# Patient Record
Sex: FEMALE | Race: WHITE | NOT HISPANIC OR LATINO | Employment: FULL TIME | URBAN - METROPOLITAN AREA
[De-identification: names, ages, dates, MRNs, and addresses within clinical notes are randomized per-mention and may not be internally consistent; named-entity substitution may affect disease eponyms.]

---

## 2019-09-26 ENCOUNTER — OFFICE VISIT (OUTPATIENT)
Dept: URGENT CARE | Facility: URGENT CARE | Age: 29
End: 2019-09-26
Payer: COMMERCIAL

## 2019-09-26 VITALS
HEART RATE: 89 BPM | WEIGHT: 159 LBS | OXYGEN SATURATION: 97 % | DIASTOLIC BLOOD PRESSURE: 60 MMHG | SYSTOLIC BLOOD PRESSURE: 120 MMHG | TEMPERATURE: 98.2 F

## 2019-09-26 DIAGNOSIS — B37.31 YEAST VAGINITIS: Primary | ICD-10-CM

## 2019-09-26 DIAGNOSIS — N89.8 VAGINAL DISCHARGE: ICD-10-CM

## 2019-09-26 LAB
SPECIMEN SOURCE: ABNORMAL
WET PREP SPEC: ABNORMAL

## 2019-09-26 PROCEDURE — 99203 OFFICE O/P NEW LOW 30 MIN: CPT | Performed by: FAMILY MEDICINE

## 2019-09-26 PROCEDURE — 87210 SMEAR WET MOUNT SALINE/INK: CPT | Performed by: FAMILY MEDICINE

## 2019-09-26 RX ORDER — FLUCONAZOLE 150 MG/1
150 TABLET ORAL ONCE
Qty: 1 TABLET | Refills: 0 | Status: SHIPPED | OUTPATIENT
Start: 2019-09-26 | End: 2019-09-26

## 2019-09-27 NOTE — PROGRESS NOTES
SUBJECTIVE:   Claudette Gilbert is a 29 year old female who  presents today for a possible vaginal discharge.    Patient developed discharge for the past 3-4 days.  No concerns for STD, no recent antibiotic use.  Had BV once.  No dysuria or frequency.  Patient did try using OTC vaginal applicator for yeast but still having symptoms.    No past medical history on file.  Current Outpatient Medications   Medication Sig Dispense Refill     Levonorgestrel-Ethinyl Estrad (LUTERA PO)        nitrofurantoin, macrocrystal-monohydrate, (MACROBID) 100 MG capsule Take 1 capsule (100 mg) by mouth 2 times daily (Patient not taking: Reported on 9/26/2019) 10 capsule 0     phenazopyridine (PYRIDIUM) 100 MG tablet Take 1 tablet (100 mg) by mouth 3 times daily as needed for pain (Patient not taking: Reported on 9/26/2019) 20 tablet 0     Social History     Tobacco Use     Smoking status: Never Smoker     Smokeless tobacco: Never Used   Substance Use Topics     Alcohol use: Not on file       ROS:   Review of systems negative except as stated above.    OBJECTIVE:  /60 (BP Location: Right arm)   Pulse 89   Temp 98.2  F (36.8  C) (Tympanic)   Wt 72.1 kg (159 lb)   LMP 09/16/2019   SpO2 97%   GENERAL APPEARANCE: healthy, alert and no distress  PSYCH: mentation appears normal and affect normal/bright    Results for orders placed or performed in visit on 09/26/19   Wet prep   Result Value Ref Range    Specimen Description Vagina     Wet Prep No Trichomonas seen     Wet Prep No clue cells seen     Wet Prep Few  Yeast seen   (A)     Wet Prep Rare  WBC'S seen       Wet Prep MANY OIL DROPLETS PRESENT        ASSESSMENT/PLAN:   (B37.3) Yeast vaginitis  (primary encounter diagnosis)  Plan: fluconazole (DIFLUCAN) 150 MG tablet            (N89.8) Vaginal discharge  Comment: yeast  Plan: Wet prep            Reassurance given, RX diflucan given for yeast vaginitis.  Okay to use OTC vaginal treatment if desires for symptomatic  treatment.    Follow up with primary provider if no improvement of symptoms in 1 week    Sylvain Gomez MD, MD  September 26, 2019 8:23 PM

## 2020-01-07 LAB
HBV SURFACE AG SERPL QL IA: NEGATIVE
HIV 1+2 AB+HIV1 P24 AG SERPL QL IA: NEGATIVE
RUBELLA ABY IGG: NORMAL
TREPONEMA ANTIBODIES: NORMAL

## 2020-01-16 ENCOUNTER — TRANSFERRED RECORDS (OUTPATIENT)
Dept: HEALTH INFORMATION MANAGEMENT | Facility: CLINIC | Age: 30
End: 2020-01-16
Payer: COMMERCIAL

## 2020-01-16 LAB — PAP-ABSTRACT: NORMAL

## 2020-07-14 LAB — GROUP B STREP PCR: NEGATIVE

## 2020-08-04 DIAGNOSIS — Z01.818 PREOP TESTING: Primary | ICD-10-CM

## 2020-08-05 DIAGNOSIS — Z01.818 PREOP TESTING: ICD-10-CM

## 2020-08-05 LAB
LABORATORY COMMENT REPORT: NORMAL
SARS-COV-2 RNA SPEC QL NAA+PROBE: NEGATIVE
SARS-COV-2 RNA SPEC QL NAA+PROBE: NORMAL
SPECIMEN SOURCE: NORMAL
SPECIMEN SOURCE: NORMAL

## 2020-08-05 PROCEDURE — U0003 INFECTIOUS AGENT DETECTION BY NUCLEIC ACID (DNA OR RNA); SEVERE ACUTE RESPIRATORY SYNDROME CORONAVIRUS 2 (SARS-COV-2) (CORONAVIRUS DISEASE [COVID-19]), AMPLIFIED PROBE TECHNIQUE, MAKING USE OF HIGH THROUGHPUT TECHNOLOGIES AS DESCRIBED BY CMS-2020-01-R: HCPCS | Performed by: OBSTETRICS & GYNECOLOGY

## 2020-08-07 ENCOUNTER — HOSPITAL ENCOUNTER (INPATIENT)
Facility: CLINIC | Age: 30
LOS: 2 days | Discharge: HOME | End: 2020-08-09
Attending: OBSTETRICS & GYNECOLOGY | Admitting: OBSTETRICS & GYNECOLOGY
Payer: COMMERCIAL

## 2020-08-07 ENCOUNTER — ANESTHESIA (OUTPATIENT)
Dept: OBGYN | Facility: CLINIC | Age: 30
End: 2020-08-07
Payer: COMMERCIAL

## 2020-08-07 ENCOUNTER — ANESTHESIA EVENT (OUTPATIENT)
Dept: OBGYN | Facility: CLINIC | Age: 30
End: 2020-08-07
Payer: COMMERCIAL

## 2020-08-07 LAB
ABO + RH BLD: NORMAL
ABO + RH BLD: NORMAL
RUPTURE OF FETAL MEMBRANES BY ROM PLUS: POSITIVE
SPECIMEN EXP DATE BLD: NORMAL

## 2020-08-07 PROCEDURE — 25000128 H RX IP 250 OP 636: Performed by: ANESTHESIOLOGY

## 2020-08-07 PROCEDURE — 25800030 ZZH RX IP 258 OP 636: Performed by: OBSTETRICS & GYNECOLOGY

## 2020-08-07 PROCEDURE — 25000125 ZZHC RX 250: Performed by: ANESTHESIOLOGY

## 2020-08-07 PROCEDURE — 86780 TREPONEMA PALLIDUM: CPT | Performed by: OBSTETRICS & GYNECOLOGY

## 2020-08-07 PROCEDURE — 40000671 ZZH STATISTIC ANESTHESIA CASE

## 2020-08-07 PROCEDURE — 25000128 H RX IP 250 OP 636: Performed by: OBSTETRICS & GYNECOLOGY

## 2020-08-07 PROCEDURE — 25000125 ZZHC RX 250: Performed by: OBSTETRICS & GYNECOLOGY

## 2020-08-07 PROCEDURE — 0KQM0ZZ REPAIR PERINEUM MUSCLE, OPEN APPROACH: ICD-10-PCS | Performed by: OBSTETRICS & GYNECOLOGY

## 2020-08-07 PROCEDURE — 84112 EVAL AMNIOTIC FLUID PROTEIN: CPT | Performed by: OBSTETRICS & GYNECOLOGY

## 2020-08-07 PROCEDURE — 37000011 ZZH ANESTHESIA WARD SERVICE

## 2020-08-07 PROCEDURE — 3E0R3BZ INTRODUCTION OF ANESTHETIC AGENT INTO SPINAL CANAL, PERCUTANEOUS APPROACH: ICD-10-PCS | Performed by: ANESTHESIOLOGY

## 2020-08-07 PROCEDURE — 86900 BLOOD TYPING SEROLOGIC ABO: CPT | Performed by: OBSTETRICS & GYNECOLOGY

## 2020-08-07 PROCEDURE — 00HU33Z INSERTION OF INFUSION DEVICE INTO SPINAL CANAL, PERCUTANEOUS APPROACH: ICD-10-PCS | Performed by: ANESTHESIOLOGY

## 2020-08-07 PROCEDURE — 86901 BLOOD TYPING SEROLOGIC RH(D): CPT | Performed by: OBSTETRICS & GYNECOLOGY

## 2020-08-07 PROCEDURE — G0463 HOSPITAL OUTPT CLINIC VISIT: HCPCS

## 2020-08-07 PROCEDURE — 12000000 ZZH R&B MED SURG/OB

## 2020-08-07 PROCEDURE — 72200001 ZZH LABOR CARE VAGINAL DELIVERY SINGLE

## 2020-08-07 RX ORDER — NALBUPHINE HYDROCHLORIDE 20 MG/ML
2.5-5 INJECTION, SOLUTION INTRAMUSCULAR; INTRAVENOUS; SUBCUTANEOUS EVERY 6 HOURS PRN
Status: DISCONTINUED | OUTPATIENT
Start: 2020-08-07 | End: 2020-08-08

## 2020-08-07 RX ORDER — EPHEDRINE SULFATE 50 MG/ML
5 INJECTION, SOLUTION INTRAMUSCULAR; INTRAVENOUS; SUBCUTANEOUS
Status: DISCONTINUED | OUTPATIENT
Start: 2020-08-07 | End: 2020-08-08

## 2020-08-07 RX ORDER — LIDOCAINE 40 MG/G
CREAM TOPICAL
Status: DISCONTINUED | OUTPATIENT
Start: 2020-08-07 | End: 2020-08-08

## 2020-08-07 RX ORDER — ONDANSETRON 2 MG/ML
4 INJECTION INTRAMUSCULAR; INTRAVENOUS EVERY 6 HOURS PRN
Status: DISCONTINUED | OUTPATIENT
Start: 2020-08-07 | End: 2020-08-08

## 2020-08-07 RX ORDER — LIDOCAINE HYDROCHLORIDE AND EPINEPHRINE 15; 5 MG/ML; UG/ML
INJECTION, SOLUTION EPIDURAL PRN
Status: DISCONTINUED | OUTPATIENT
Start: 2020-08-07 | End: 2020-08-07

## 2020-08-07 RX ORDER — BUPIVACAINE HYDROCHLORIDE 2.5 MG/ML
INJECTION, SOLUTION EPIDURAL; INFILTRATION; INTRACAUDAL PRN
Status: DISCONTINUED | OUTPATIENT
Start: 2020-08-07 | End: 2020-08-07

## 2020-08-07 RX ORDER — PRENATAL VIT/IRON FUM/FOLIC AC 27MG-0.8MG
1 TABLET ORAL DAILY
COMMUNITY
End: 2021-11-29

## 2020-08-07 RX ORDER — NALOXONE HYDROCHLORIDE 0.4 MG/ML
.1-.4 INJECTION, SOLUTION INTRAMUSCULAR; INTRAVENOUS; SUBCUTANEOUS
Status: DISCONTINUED | OUTPATIENT
Start: 2020-08-07 | End: 2020-08-08

## 2020-08-07 RX ORDER — OXYTOCIN 10 [USP'U]/ML
10 INJECTION, SOLUTION INTRAMUSCULAR; INTRAVENOUS
Status: DISCONTINUED | OUTPATIENT
Start: 2020-08-07 | End: 2020-08-08

## 2020-08-07 RX ORDER — OXYTOCIN/0.9 % SODIUM CHLORIDE 30/500 ML
100-340 PLASTIC BAG, INJECTION (ML) INTRAVENOUS CONTINUOUS PRN
Status: COMPLETED | OUTPATIENT
Start: 2020-08-07 | End: 2020-08-07

## 2020-08-07 RX ORDER — ACETAMINOPHEN 325 MG/1
650 TABLET ORAL EVERY 4 HOURS PRN
Status: DISCONTINUED | OUTPATIENT
Start: 2020-08-07 | End: 2020-08-08

## 2020-08-07 RX ORDER — METHYLERGONOVINE MALEATE 0.2 MG/ML
200 INJECTION INTRAVENOUS
Status: DISCONTINUED | OUTPATIENT
Start: 2020-08-07 | End: 2020-08-08

## 2020-08-07 RX ORDER — CARBOPROST TROMETHAMINE 250 UG/ML
250 INJECTION, SOLUTION INTRAMUSCULAR
Status: DISCONTINUED | OUTPATIENT
Start: 2020-08-07 | End: 2020-08-08

## 2020-08-07 RX ORDER — SODIUM CHLORIDE, SODIUM LACTATE, POTASSIUM CHLORIDE, CALCIUM CHLORIDE 600; 310; 30; 20 MG/100ML; MG/100ML; MG/100ML; MG/100ML
INJECTION, SOLUTION INTRAVENOUS CONTINUOUS
Status: DISCONTINUED | OUTPATIENT
Start: 2020-08-07 | End: 2020-08-08

## 2020-08-07 RX ORDER — FENTANYL CITRATE 50 UG/ML
50-100 INJECTION, SOLUTION INTRAMUSCULAR; INTRAVENOUS
Status: DISCONTINUED | OUTPATIENT
Start: 2020-08-07 | End: 2020-08-08

## 2020-08-07 RX ORDER — OXYTOCIN/0.9 % SODIUM CHLORIDE 30/500 ML
1-24 PLASTIC BAG, INJECTION (ML) INTRAVENOUS CONTINUOUS
Status: DISCONTINUED | OUTPATIENT
Start: 2020-08-07 | End: 2020-08-08

## 2020-08-07 RX ORDER — NALOXONE HYDROCHLORIDE 0.4 MG/ML
.1-.4 INJECTION, SOLUTION INTRAMUSCULAR; INTRAVENOUS; SUBCUTANEOUS
Status: DISCONTINUED | OUTPATIENT
Start: 2020-08-07 | End: 2020-08-07

## 2020-08-07 RX ORDER — IBUPROFEN 800 MG/1
800 TABLET, FILM COATED ORAL
Status: DISCONTINUED | OUTPATIENT
Start: 2020-08-07 | End: 2020-08-08

## 2020-08-07 RX ORDER — OXYCODONE AND ACETAMINOPHEN 5; 325 MG/1; MG/1
1 TABLET ORAL
Status: DISCONTINUED | OUTPATIENT
Start: 2020-08-07 | End: 2020-08-08

## 2020-08-07 RX ORDER — ONDANSETRON 2 MG/ML
4 INJECTION INTRAMUSCULAR; INTRAVENOUS EVERY 6 HOURS PRN
Status: DISCONTINUED | OUTPATIENT
Start: 2020-08-07 | End: 2020-08-07

## 2020-08-07 RX ORDER — TRANEXAMIC ACID 10 MG/ML
1 INJECTION, SOLUTION INTRAVENOUS EVERY 30 MIN PRN
Status: DISCONTINUED | OUTPATIENT
Start: 2020-08-07 | End: 2020-08-08

## 2020-08-07 RX ADMIN — BUPIVACAINE HYDROCHLORIDE 10 ML: 2.5 INJECTION, SOLUTION EPIDURAL; INFILTRATION; INTRACAUDAL at 18:41

## 2020-08-07 RX ADMIN — Medication 2 MILLI-UNITS/MIN: at 15:03

## 2020-08-07 RX ADMIN — LIDOCAINE HYDROCHLORIDE,EPINEPHRINE BITARTRATE 3 ML: 15; .005 INJECTION, SOLUTION EPIDURAL; INFILTRATION; INTRACAUDAL; PERINEURAL at 18:36

## 2020-08-07 RX ADMIN — Medication 100 ML/HR: at 23:56

## 2020-08-07 RX ADMIN — Medication: at 18:43

## 2020-08-07 RX ADMIN — SODIUM CHLORIDE, POTASSIUM CHLORIDE, SODIUM LACTATE AND CALCIUM CHLORIDE: 600; 310; 30; 20 INJECTION, SOLUTION INTRAVENOUS at 13:04

## 2020-08-07 RX ADMIN — ONDANSETRON 4 MG: 2 INJECTION INTRAMUSCULAR; INTRAVENOUS at 18:05

## 2020-08-07 RX ADMIN — LIDOCAINE HYDROCHLORIDE,EPINEPHRINE BITARTRATE 2 ML: 15; .005 INJECTION, SOLUTION EPIDURAL; INFILTRATION; INTRACAUDAL; PERINEURAL at 18:38

## 2020-08-07 RX ADMIN — SODIUM CHLORIDE, POTASSIUM CHLORIDE, SODIUM LACTATE AND CALCIUM CHLORIDE 1000 ML: 600; 310; 30; 20 INJECTION, SOLUTION INTRAVENOUS at 18:05

## 2020-08-07 RX ADMIN — PROCHLORPERAZINE EDISYLATE 5 MG: 5 INJECTION INTRAMUSCULAR; INTRAVENOUS at 21:59

## 2020-08-07 ASSESSMENT — MIFFLIN-ST. JEOR: SCORE: 1612.65

## 2020-08-07 NOTE — PROVIDER NOTIFICATION
08/07/20 1333   Provider Notification   Provider Name/Title Dr. Huffman   Method of Notification At Bedside   Met patient and POC to start pitocin in a couple hours per Dr. Huffman.

## 2020-08-07 NOTE — PROVIDER NOTIFICATION
08/07/20 1243   Provider Notification   Provider Name/Title Dr. Huffman   Method of Notification Phone   Request Evaluate - Remote   Notification Reason Lab/Diagnostic Study   POC to admit patient and watch for 1 hour and if no contraction/labor may start pitocin. SAURABH

## 2020-08-07 NOTE — H&P
"[facility] Labor and Delivery Progress Note    Claudette Gilbert MRN# [id number]   Age: 30 year old YOB: 1990           Subjective:     30 yr old  at 39+4 admitted with SROM - clear and regular cxts. Prenatal care uncomplicated ( transferred to [facility] at 33 wks)          Objective:     Patient Vitals for the past 8 hrs:   BP Temp Temp src Pulse Resp Height Weight   20 1251 -- 98.9  F (37.2  C) Oral -- -- -- --   20 1146 127/89 99  F (37.2  C) Oral 92 18 1.676 m (5' 6\") 87.1 kg (192 lb)        Cervical Exam:  2/60%/-2        Position: Anterior    Membranes: Ruptured   - clear    Fetal Heart Rate:    Monitor:   external   Variability:   average   Baseline Rate:   140's; + accels   Fetal Heart Rate Tracing: cat 1    GBS neg  COVID neg ( 2 days ago )  EFW 9# and vtx    Hx  without complication ( 9# )          Assessment:   1)  IUP at  39w4d   2)  SROM/early labor        Plan:   Pitocin as clinically indicated  Epidural      Fany Huffman MD        "

## 2020-08-07 NOTE — PLAN OF CARE
Data: [facility]: 2020 11:06 AM.  Reason for maternal/fetal assessment is leaking vaginal fluid. Patient reports seeing some clear mucus this morning at 0500 with occasional continued leaking .  Patient is a .  Prenatal record reviewed. Pregnancy has been uncomplicated..  Gestational Age 39w4d. VSS. Fetal movement present. Patient denies uterine contractions, vaginal bleeding, abdominal pain, pelvic pressure, nausea, vomiting, headache, visual disturbances, epigastric or URQ pain, significant edema. Support person is present.   Action: Verbal consent for EFM. Triage assessment completed. Bill of rights reviewed.  Response: Patient verbalized agreement with plan. Will contact Dr Fany Huffman with update and further orders.

## 2020-08-07 NOTE — PROVIDER NOTIFICATION
08/07/20 1735   Provider Notification   Provider Name/Title Dr. Pro   Method of Notification Phone   Request Evaluate - Remote   Notification Reason Status Update   Updated MD and he is 25-30 min out no change in POC

## 2020-08-07 NOTE — ANESTHESIA PREPROCEDURE EVALUATION
Anesthesia Pre-Procedure Evaluation    Patient: Claudette Gilbert   MRN: [id number] : 1990          Preoperative Diagnosis: * No surgery found *        History reviewed. No pertinent past medical history.  Past Surgical History:   Procedure Laterality Date     GALLBLADDER SURGERY  2017     Anesthesia Evaluation       history and physical reviewed . Pt has had prior anesthetic. Type: Regional    No history of anesthetic complications          ROS/MED HX    ENT/Pulmonary:  - neg pulmonary ROS    (-) asthma   Neurologic:  - neg neurologic ROS    (-) Other neuro hx and Neuropathy   Cardiovascular:  - neg cardiovascular ROS      (-) hypertension, taking anticoagulants/antiplatelets and syncope   METS/Exercise Tolerance:     Hematologic:  - neg hematologic  ROS       Musculoskeletal:   (+)  other musculoskeletal- significant lumbar left scoliosis      GI/Hepatic:  - neg GI/hepatic ROS       Renal/Genitourinary:  - ROS Renal section negative       Endo:  - neg endo ROS       Psychiatric:         Infectious Disease:  - neg infectious disease ROS       Malignancy:         Other:                     neg OB ROS            Physical Exam  Normal systems: cardiovascular, pulmonary and dental    Airway   Mallampati: II  TM distance: > 3 FB  Neck ROM: full  Mouth opening: > 3 cm    Dental     Cardiovascular       Pulmonary             No results found for: WBC, HGB, HCT, PLT, CRP, SED, NA, POTASSIUM, CHLORIDE, CO2, BUN, CR, GLC, WONG, PHOS, MAG, ALBUMIN, PROTTOTAL, ALT, AST, GGT, ALKPHOS, BILITOTAL, BILIDIRECT, LIPASE, AMYLASE, JOEY, PTT, INR, FIBR, TSH, T4, T3, HCG, HCGS, CKTOTAL, CKMB, TROPN    Preop Vitals  BP Readings from Last 3 Encounters:   20 121/79   19 120/60   13 130/64    Pulse Readings from Last 3 Encounters:   20 81   19 89   13 94      Resp Readings from Last 3 Encounters:   20 18    SpO2 Readings from Last 3 Encounters:   20 100%   19 97%   13 97%     "  Temp Readings from Last 1 Encounters:   08/07/20 98  F (36.7  C) (Oral)    Ht Readings from Last 1 Encounters:   08/07/20 1.676 m (5' 6\")      Wt Readings from Last 1 Encounters:   08/07/20 87.1 kg (192 lb)    Estimated body mass index is 30.99 kg/m  as calculated from the following:    Height as of this encounter: 1.676 m (5' 6\").    Weight as of this encounter: 87.1 kg (192 lb).       Anesthesia Plan  Procedure only, no anesthetic delivered    History & Physical Review  History and physical reviewed and following examination; no interval change.    ASA Status:  2 .  OB Epidural Asa: 2       Plan for Epidural         Continuous Labor Epidural: Indication is for labor pain. Following an discussion of the expectations, benefits and risks (including but not limited to nerve damage, infection, bleeding, spinal headache, partial or failed block), the patient appears to understand and consents to proceed. Questions were encouraged and answered.         Postoperative Care      Consents  Anesthetic plan, risks, benefits and alternatives discussed with:  Patient and Patient..                 Adrian Herrera MD                    .  "

## 2020-08-08 LAB — T PALLIDUM AB SER QL: NONREACTIVE

## 2020-08-08 PROCEDURE — 25000125 ZZHC RX 250: Performed by: OBSTETRICS & GYNECOLOGY

## 2020-08-08 PROCEDURE — 12000000 ZZH R&B MED SURG/OB

## 2020-08-08 PROCEDURE — 40000083 ZZH STATISTIC IP LACTATION SERVICES 1-15 MIN

## 2020-08-08 PROCEDURE — 25000132 ZZH RX MED GY IP 250 OP 250 PS 637: Performed by: OBSTETRICS & GYNECOLOGY

## 2020-08-08 RX ORDER — OXYTOCIN 10 [USP'U]/ML
10 INJECTION, SOLUTION INTRAMUSCULAR; INTRAVENOUS
Status: DISCONTINUED | OUTPATIENT
Start: 2020-08-08 | End: 2020-08-09 | Stop reason: HOSPADM

## 2020-08-08 RX ORDER — MODIFIED LANOLIN
OINTMENT (GRAM) TOPICAL
Status: DISCONTINUED | OUTPATIENT
Start: 2020-08-08 | End: 2020-08-09 | Stop reason: HOSPADM

## 2020-08-08 RX ORDER — TRANEXAMIC ACID 10 MG/ML
1 INJECTION, SOLUTION INTRAVENOUS EVERY 30 MIN PRN
Status: DISCONTINUED | OUTPATIENT
Start: 2020-08-08 | End: 2020-08-09 | Stop reason: HOSPADM

## 2020-08-08 RX ORDER — ACETAMINOPHEN 325 MG/1
650 TABLET ORAL EVERY 4 HOURS PRN
Status: DISCONTINUED | OUTPATIENT
Start: 2020-08-08 | End: 2020-08-09 | Stop reason: HOSPADM

## 2020-08-08 RX ORDER — IBUPROFEN 800 MG/1
800 TABLET, FILM COATED ORAL EVERY 6 HOURS PRN
Status: DISCONTINUED | OUTPATIENT
Start: 2020-08-08 | End: 2020-08-09 | Stop reason: HOSPADM

## 2020-08-08 RX ORDER — BISACODYL 10 MG
10 SUPPOSITORY, RECTAL RECTAL DAILY PRN
Status: DISCONTINUED | OUTPATIENT
Start: 2020-08-10 | End: 2020-08-09 | Stop reason: HOSPADM

## 2020-08-08 RX ORDER — AMOXICILLIN 250 MG
1 CAPSULE ORAL 2 TIMES DAILY
Status: DISCONTINUED | OUTPATIENT
Start: 2020-08-08 | End: 2020-08-09 | Stop reason: HOSPADM

## 2020-08-08 RX ORDER — OXYTOCIN/0.9 % SODIUM CHLORIDE 30/500 ML
340 PLASTIC BAG, INJECTION (ML) INTRAVENOUS CONTINUOUS PRN
Status: DISCONTINUED | OUTPATIENT
Start: 2020-08-08 | End: 2020-08-09 | Stop reason: HOSPADM

## 2020-08-08 RX ORDER — NALOXONE HYDROCHLORIDE 0.4 MG/ML
.1-.4 INJECTION, SOLUTION INTRAMUSCULAR; INTRAVENOUS; SUBCUTANEOUS
Status: DISCONTINUED | OUTPATIENT
Start: 2020-08-08 | End: 2020-08-09 | Stop reason: HOSPADM

## 2020-08-08 RX ORDER — OXYTOCIN/0.9 % SODIUM CHLORIDE 30/500 ML
100 PLASTIC BAG, INJECTION (ML) INTRAVENOUS CONTINUOUS
Status: DISCONTINUED | OUTPATIENT
Start: 2020-08-08 | End: 2020-08-09 | Stop reason: HOSPADM

## 2020-08-08 RX ORDER — HYDROCORTISONE 2.5 %
CREAM (GRAM) TOPICAL 3 TIMES DAILY PRN
Status: DISCONTINUED | OUTPATIENT
Start: 2020-08-08 | End: 2020-08-09 | Stop reason: HOSPADM

## 2020-08-08 RX ORDER — OXYCODONE HYDROCHLORIDE 5 MG/1
5 TABLET ORAL EVERY 4 HOURS PRN
Status: DISCONTINUED | OUTPATIENT
Start: 2020-08-08 | End: 2020-08-09 | Stop reason: HOSPADM

## 2020-08-08 RX ORDER — AMOXICILLIN 250 MG
2 CAPSULE ORAL 2 TIMES DAILY
Status: DISCONTINUED | OUTPATIENT
Start: 2020-08-08 | End: 2020-08-09 | Stop reason: HOSPADM

## 2020-08-08 RX ADMIN — SENNOSIDES AND DOCUSATE SODIUM 1 TABLET: 8.6; 5 TABLET ORAL at 20:11

## 2020-08-08 RX ADMIN — IBUPROFEN 800 MG: 800 TABLET, FILM COATED ORAL at 18:41

## 2020-08-08 RX ADMIN — IBUPROFEN 800 MG: 800 TABLET, FILM COATED ORAL at 07:11

## 2020-08-08 RX ADMIN — SENNOSIDES AND DOCUSATE SODIUM 1 TABLET: 8.6; 5 TABLET ORAL at 11:37

## 2020-08-08 RX ADMIN — IBUPROFEN 800 MG: 800 TABLET, FILM COATED ORAL at 12:59

## 2020-08-08 RX ADMIN — LIDOCAINE HYDROCHLORIDE 20 ML: 10 INJECTION, SOLUTION EPIDURAL; INFILTRATION; INTRACAUDAL; PERINEURAL at 00:06

## 2020-08-08 NOTE — LACTATION NOTE
Lactation visit. Patient has been pumping and feeding LGA  EBM since birth. Blood sugar levels stable, but needing 2 more above 45 to discontinue. She has been getting anywhere between 3-15mL per session, 15mL last time. She has no concerns or questions with pumping. Her plan is to exclusively pump. Encouraged her to call if questions or concerns arise.

## 2020-08-08 NOTE — PROGRESS NOTES
Post-partum Note      S: Patient is doing well today.  Pain is controlled with PO medications.  Tolerating regular diet without nausea or vomiting.  Ambulating without dizziness.  Urinating without difficulty. Lochia normal.  Breastfeeding.    O:   Patient Vitals for the past 24 hrs:   BP Temp Temp src Pulse Resp SpO2 Height Weight   08/08/20 0900 122/72 98.5  F (36.9  C) Oral 94 18 -- -- --   08/08/20 0145 127/86 -- -- -- -- -- -- --   08/08/20 0130 133/87 -- -- -- -- -- -- --   08/08/20 0115 133/84 -- -- -- -- -- -- --   08/08/20 0100 123/82 -- -- -- -- -- -- --   08/08/20 0045 122/80 -- -- -- -- -- -- --   08/08/20 0030 128/79 -- -- -- -- -- -- --   08/08/20 0015 (!) 140/71 -- -- -- -- -- -- --   08/08/20 0004 -- 98.1  F (36.7  C) Oral -- -- -- -- --   08/07/20 2340 127/69 -- -- -- -- -- -- --   08/07/20 2335 (!) 180/97 -- -- -- -- -- -- --   08/07/20 2330 126/82 -- -- -- -- -- -- --   08/07/20 2315 133/82 -- -- -- -- -- -- --   08/07/20 2310 (!) 145/96 -- -- -- -- -- -- --   08/07/20 2306 (!) 140/80 -- -- -- -- -- -- --   08/07/20 2300 130/80 97.6  F (36.4  C) Oral -- 18 -- -- --   08/07/20 2250 122/75 -- -- -- -- -- -- --   08/07/20 2215 119/68 -- -- -- -- -- -- --   08/07/20 2145 120/81 -- -- -- -- -- -- --   08/07/20 2100 -- 98  F (36.7  C) Oral -- -- -- -- --   08/07/20 2045 120/81 -- -- -- -- -- -- --   08/07/20 2015 126/82 -- -- -- -- -- -- --   08/07/20 1950 136/82 98.2  F (36.8  C) Oral -- -- -- -- --   08/07/20 1915 120/74 98.2  F (36.8  C) Oral -- 18 -- -- --   08/07/20 1910 134/85 -- -- -- -- 97 % -- --   08/07/20 1904 121/77 -- -- -- -- -- -- --   08/07/20 1903 121/77 -- -- -- -- -- -- --   08/07/20 1859 117/76 -- -- -- -- -- -- --   08/07/20 1856 117/76 -- -- -- -- -- -- --   08/07/20 1853 122/76 -- -- -- -- -- -- --   08/07/20 1851 120/76 -- -- -- -- -- -- --   08/07/20 1849 118/77 -- -- -- -- -- -- --   08/07/20 1847 118/79 -- -- -- -- -- -- --   08/07/20 1845 121/79 98  F (36.7  C) Oral -- -- 100  "% -- --   20 1843 125/79 -- -- -- -- -- -- --   20 1841 133/82 -- -- -- -- -- -- --   20 1839 (!) 142/91 -- -- -- -- -- -- --   20 1837 (!) 145/92 -- -- -- -- -- -- --   20 1835 (!) 143/87 -- -- -- -- -- -- --   20 1812 -- -- -- -- 18 -- -- --   20 1800 -- 98.2  F (36.8  C) Oral -- -- -- -- --   20 1751 (!) 146/86 -- -- 81 18 -- -- --   20 1655 -- 98.1  F (36.7  C) Oral -- -- -- -- --   20 1622 125/88 -- -- 87 18 -- -- --   20 1556 -- 98.7  F (37.1  C) Oral -- -- -- -- --   20 1506 127/82 98.1  F (36.7  C) Oral -- 18 -- -- --   20 1251 -- 98.9  F (37.2  C) Oral -- -- -- -- --   20 1146 127/89 99  F (37.2  C) Oral 92 18 -- 1.676 m (5' 6\") 87.1 kg (192 lb)     Gen:  Resting comfortably, NAD  Pulm:  Breathing comfortably on room air  Abd:  Soft, appropriately ttp, non-distended.Fundus at umbilicus, firm and non-tender.  Ext:  non-tender, trace bilateral LE edema    I/O last 3 completed shifts:  In: -   Out: 712 [Urine:500; Blood:212]    Hgb:     No results found for: HGB    Assessment/Plan:  30 year old G2 now  on PPD #1 s/p  after presenting in spontaneous labor at 39w5d. 2nd degree laceration with repair     1. Continue with routine postpartum management  2. Pain well controlled  3. EBL: 212ml ; HGB not collected   4.   Lab Results   Component Value Date    ABO A 2020    RH Pos 2020    , Rubella immune   5. Feed: Breastfeeding, going well.   6. CV/RESP: no acute issues  7. DVT PPX: ambulation  8. COVID negative     Dispo: Anticipate DC home PPD2.    MD Son [facility]  2020, 9:36 AM    "

## 2020-08-08 NOTE — PROVIDER NOTIFICATION
08/07/20 1950   Provider Notification   Provider Name/Title Dr. Pro   Method of Notification At Bedside     MD at bedside for rupture of forebag. Moderate amount of meconium stained fluid noted, and SVE per MD 4/70/-1.

## 2020-08-08 NOTE — PLAN OF CARE
Patient stable and meeting expected goals. VSS. Up with assistance, as right leg is still numb from epidural. Patient able to ambulate to bathroom with assistance but educated to call before getting up. Patient declined pain medication. Feeding plan is to pump and bottle. No EBM from pumping yet, but patient successful with hand expression. Educated on feeding closer to 2 hours, due to fluctuating infant OT's, and to begin the pumping/hand expression process sooner, so infant will be fed at 2 hours. Parents open to supplementation if needed. Bonding with infant. Continue to monitor.

## 2020-08-08 NOTE — PROVIDER NOTIFICATION
08/07/20 2300   Provider Notification   Provider Name/Title MDA   Method of Notification At Bedside     MDA at bedside for tommy.

## 2020-08-08 NOTE — L&D DELIVERY NOTE
OB Vaginal Delivery Note      HPI:  Pt is a 30 year old  @ 39w5d who presented to L&D on 2020 for SROM.            Prenatal labs:  A antibody screen: negative, Rubella inmune,  Hep B/HIV/RPR all negative, GC/CT negative, GCT normal, GBS neg    Pregnancy complications:  CHARAN at 33 wks    Hospital Course:    First Stage: On admission, contractions were every 6 minutes and patient was 2cm dilated. FHTs were in the 140 with accelerations present. mod variability,  no decelerations noted.   Abdomen was non-tender.  EFW was 9lbs by Leopold's.  Patient's labor induced with pitocin.    At the patient's request, she received epidural  analgesia.  She did receive pitocin for induction of labor, to a maximum of 8mu/min.  SROM occurred at 0500 with clerar fluid noted, at 1953 a forebag was noted and upon rupture there was mec stained fluid .  Patient reached complete cervical dilation at 2335 on 2020.    Second Stage:  Patient did not  labor down , and began pushing at 2335.  Good maternal expulsive efforts were noted.  Fetal heart tones remained reassuring during the second stage.  Baseline 130, with mod variability, no decelerations noted.  She was able to bring the fetal vertex to a full crown.  The fetal vertex was then easily delivered, followed by the fetal shoulders without complications.  A  nuchal cord was not present.  A male infant was then delivered without complications at 2353 on 2020.  The mouth and nares were bulb suctioned.  The cord was clamped after it had stopped pulsating, cut and the infant was placed on moms abdomen.  The infant's weight was 10 pounds 0.5 ounces.  Apgars were 8 and 9 at one and five minutes .       Third Stage:  The placenta then delivered at 1157 .  It was noted to be intact with a three vessel cord.  The patient's perineum was inspected and  2nd degree midline noted.  The area was infiltrated with 1% lidocaine and repaired in the usual fashion using 3-0 vicryl suture.   EBL for the procedure was 212cc.    Sponge and needle counts were correct.  The patient and infant remained in the delivery suite following delivery in stable condition.    Christofer Pro MD  8/8/2020  12:17 AM

## 2020-08-08 NOTE — PLAN OF CARE
Patient up and around voiding without complications taking ibuprofen for pain with relief -using tucks and ice to bottom

## 2020-08-08 NOTE — ANESTHESIA POSTPROCEDURE EVALUATION
Patient: Claudette Gilbert    * No procedures listed *    Diagnosis:* No pre-op diagnosis entered *  Diagnosis Additional Information: No value filed.    Anesthesia Type:  Epidural    Note:  Anesthesia Post Evaluation         Comments:     S/P epidural for labor.   I or my partner was immediately available for management of this patient during epidural analgesia infusion.  VSS.  Doing well. Block resolved.  Neuro at baseline. Denies positional headache. Minimal side effects easily managed w/ PRN meds. No apparent anesthetic complications. No follow-up required.    Dong Miramontes MD          Last vitals:  Vitals:    08/08/20 0145 08/08/20 0900 08/08/20 1300   BP: 127/86 122/72 125/63   Pulse:  94 97   Resp:  18 20   Temp:  98.5  F (36.9  C) 98.4  F (36.9  C)   SpO2:            Electronically Signed By: Dong Miramontes MD  August 8, 2020  1:13 PM

## 2020-08-08 NOTE — PLAN OF CARE
Data: Claudette Gilbert transferred to 430 via wheelchair at 0250. Baby transferred via parent's arms.  Action: Receiving unit notified of transfer: Yes. Patient and family notified of room change. Belongings sent to receiving unit. Accompanied by Registered Nurse. Oriented patient to surroundings. Call light within reach. ID bands double-checked with JB Ozuna.  Response: Patient tolerated transfer and is stable.    Patients mobililty level scored using the bedside mobility assistance tool (BMAT). Patient is at a mobility level test number: 2. Mobility equipment used: wheelchair. Required assist of 1 staff members. Further use of BMAT scoring required.

## 2020-08-08 NOTE — PROVIDER NOTIFICATION
08/07/20 4548   Provider Notification   Provider Name/Title Dr. Pro   Method of Notification Phone     MD notified of SVE 10/100/0 with good progress with a practice push. Will set up for delivery & MD on his way.

## 2020-08-08 NOTE — PROGRESS NOTES
"Feeling well with epidural.  No complaints or concerns    /74   Pulse 81   Temp 98  F (36.7  C) (Oral)   Resp 18   Ht 1.676 m (5' 6\")   Wt 87.1 kg (192 lb)   LMP 2019   SpO2 97%   BMI 30.99 kg/m     EFW : 150 mod V + accels no decels Cat 1  EFW 9 lbs    Forebag ruptured with lots of fluid meconium stained    CVX: /-1   Pitocin 8mu/min    A/P  30 year old  at 39w4d with SROM at 0500 on 20   Mec stained fluid: will have NICU at birth per protocol  Anticipate TANIA Pro,   2020  [phone]     "

## 2020-08-08 NOTE — ANESTHESIA PROCEDURE NOTES
Procedure note : epidural catheter  Staff -   Anesthesiologist:  Adrian Herrera MD      Performed By: anesthesiologist    Referred By: Inocencia    Pre-Procedure  Performed by Adrian Herrera MD  Referred by Inocencia  Location: OB      Pre-Anesthestic Checklist: patient identified, IV checked, risks and benefits discussed, informed consent, monitors and equipment checked, pre-op evaluation and at physician/surgeon's request    Timeout  Correct Patient: Yes   Correct Procedure: Yes   Correct Site: Yes   Correct Laterality: N/A   Correct Position: Yes   Site Marked: N/A   .   Procedure Documentation    Diagnosis:labor.    Procedure: epidural catheter, .   Patient Position:sitting Insertion Site:T4-5  (midline approach) Injection technique: LORT saline   Local skin infiltrated with 3 mL of 1% lidocaine.  BETHANIE at 6 cm    Patient Prep/Sterile Barriers; mask, sterile gloves, povidone-iodine 7.5% surgical scrub, patient draped.  .  Needle: Touhy needle   Needle Gauge: 17.    Needle Length (Inches) 3.5   # of attempts: 3 (significant left lumbar scoliosis) and  # of redirects:  2 .    Catheter: 19 G . .  Catheter threaded easily  .  11 cm at skin.   .    Assessment/Narrative  Paresthesias: No.  .  .  Aspiration negative for heme or CSF  . Test dose of 3 mL lidocaine 1.5% w/ 1:200,000 epinephrine at. Test dose negative for signs of intravascular, subdural or intrathecal injection.

## 2020-08-09 VITALS
BODY MASS INDEX: 30.86 KG/M2 | WEIGHT: 192 LBS | TEMPERATURE: 98.2 F | HEART RATE: 85 BPM | SYSTOLIC BLOOD PRESSURE: 123 MMHG | DIASTOLIC BLOOD PRESSURE: 79 MMHG | HEIGHT: 66 IN | RESPIRATION RATE: 20 BRPM | OXYGEN SATURATION: 97 %

## 2020-08-09 LAB — HGB BLD-MCNC: 11.1 G/DL (ref 11.7–15.7)

## 2020-08-09 PROCEDURE — 36415 COLL VENOUS BLD VENIPUNCTURE: CPT | Performed by: OBSTETRICS & GYNECOLOGY

## 2020-08-09 PROCEDURE — 25000132 ZZH RX MED GY IP 250 OP 250 PS 637: Performed by: OBSTETRICS & GYNECOLOGY

## 2020-08-09 PROCEDURE — 85018 HEMOGLOBIN: CPT | Performed by: OBSTETRICS & GYNECOLOGY

## 2020-08-09 RX ORDER — AMOXICILLIN 250 MG
1 CAPSULE ORAL 2 TIMES DAILY PRN
Qty: 60 TABLET | Refills: 0 | Status: SHIPPED | OUTPATIENT
Start: 2020-08-09 | End: 2021-11-29

## 2020-08-09 RX ORDER — IBUPROFEN 800 MG/1
800 TABLET, FILM COATED ORAL EVERY 6 HOURS PRN
Qty: 60 TABLET | Refills: 0 | Status: SHIPPED | OUTPATIENT
Start: 2020-08-09 | End: 2021-11-29

## 2020-08-09 RX ORDER — ACETAMINOPHEN 325 MG/1
650 TABLET ORAL EVERY 4 HOURS PRN
Qty: 60 TABLET | Refills: 0 | Status: SHIPPED | OUTPATIENT
Start: 2020-08-09 | End: 2021-11-29

## 2020-08-09 RX ADMIN — IBUPROFEN 800 MG: 800 TABLET, FILM COATED ORAL at 06:05

## 2020-08-09 RX ADMIN — SENNOSIDES AND DOCUSATE SODIUM 1 TABLET: 8.6; 5 TABLET ORAL at 06:10

## 2020-08-09 NOTE — DISCHARGE INSTRUCTIONS
Postpartum Vaginal Delivery Instructions   FOLLOW UP: 6 WEEKS  LACTATION: [phone]    Activity       Ask family and friends for help when you need it.    Do not place anything in your vagina for 6 weeks.    You are not restricted on other activities, but take it easy for a few weeks to allow your body to recover from delivery.  You are able to do any activities you feel up to that point.    No driving until you have stopped taking your pain medications (usually two weeks after delivery).     Call your health care provider if you have any of these symptoms:       Increased pain, swelling, redness, or fluid around your stiches from an episiotomy or perineal tear.    A fever above 100.4 F (38 C) with or without chills when placing a thermometer under your tongue.    You soak a sanitary pad with blood within 1 hour, or you see blood clots larger than a golf ball.    Bleeding that lasts more than 6 weeks.    Vaginal discharge that smells bad.    Severe pain, cramping or tenderness in your lower belly area.    A need to urinate more frequently (use the toilet more often), more urgently (use the toilet very quickly), or it burns when you urinate.    Nausea and vomiting.    Redness, swelling or pain around a vein in your leg.    Problems breastfeeding or a red or painful area on your breast.    Chest pain and cough or are gasping for air.    Problems coping with sadness, anxiety, or depression.  If you have any concerns about hurting yourself or the baby, call your provider immediately.     You have questions or concerns after you return home.     Keep your hands clean:  Always wash your hands before touching your perineal area and stitches.  This helps reduce your risk of infection.  If your hands aren't dirty, you may use an alcohol hand-rub to clean your hands. Keep your nails clean and short.    Contact your doctor if you have a temperature >100.4F, if you experience heavy bleeding and are soaking through a pad in  less than one hour, if you are not able to eat or drink, or have severe abdominal pain.     Nothing in the vagina for 6 weeks post partum. No intercourse, tampons or douching.   Showers are okay. No soaking in a bath, sitz baths for 15-20 min 2x daily are okay.     Please make a follow up appointment in the office in 4-6 weeks.     Please call the office with chest pain with shortness of breath or blurry vision with a headache, or upper abdominal pain as this could be concerning for preeclampsia (blood pressure problems with pregnancy and post partum).

## 2020-08-09 NOTE — PROGRESS NOTES
Post-partum Note      S: Patient is doing well today.  Pain is controlled with PO medications.  Tolerating regular diet without nausea or vomiting.  Ambulating without dizziness.  Urinating without difficulty. Lochia normal.  Breastfeeding.    O:   Patient Vitals for the past 24 hrs:   BP Temp Temp src Pulse Heart Rate Resp   20 0100 133/82 98.7  F (37.1  C) Oral -- 96 18   20 1500 119/74 98.2  F (36.8  C) Oral 85 -- 18   20 1300 125/63 98.4  F (36.9  C) Oral 97 -- 20   20 0900 122/72 98.5  F (36.9  C) Oral 94 -- 18     Gen:  Resting comfortably, NAD  Pulm:  Breathing comfortably on room air  Abd:  Soft, appropriately ttp, non-distended.Fundus at umbilicus, firm and non-tender.  Ext:  non-tender, trace bilateral LE edema    I/O last 3 completed shifts:  In: -   Out: 712 [Urine:500; Blood:212]    Hgb:     No results found for: HGB    Assessment/Plan:  30 year old G2 now  on PPD #2 s/p  after presenting in spontaneous labor at 39w5d. 2nd degree laceration with repair     1. Continue with routine postpartum management  2. Pain well controlled  3. EBL: 212ml ; HGB not collected   4.   Lab Results   Component Value Date    ABO A 2020    RH Pos 2020    , Rubella immune   5. Feed: Breastfeeding, going well.   6. CV/RESP: no acute issues  7. DVT PPX: ambulation  8. COVID negative     Dispo: Anticipate DC home today, warning signs reviewed. F/u in 4-6 weeks     MD Son [facility]  2020, 9:36 AM

## 2020-08-09 NOTE — PLAN OF CARE
VSS and pt meeting expected goals for the shift. Pumping and bottle feeding pumped breast milk. Independent with self and infant cares.

## 2020-08-09 NOTE — PLAN OF CARE
Doing well with self and infant cares, pumping and bottle feeding EBM, as this is her plan for at home. Ibuprofen for pain with stated relief, denies difficulty voiding. FOB present and supportive, involved in infant cares.

## 2020-08-09 NOTE — PLAN OF CARE
Patient up and around voiding without complications   Taking ibuprofen for pain with relief   Discharge orders received -instructions reviewed and education complete

## 2021-11-29 ENCOUNTER — OFFICE VISIT (OUTPATIENT)
Dept: FAMILY MEDICINE | Facility: CLINIC | Age: 31
End: 2021-11-29
Payer: COMMERCIAL

## 2021-11-29 VITALS
SYSTOLIC BLOOD PRESSURE: 116 MMHG | OXYGEN SATURATION: 99 % | HEART RATE: 70 BPM | BODY MASS INDEX: 27.32 KG/M2 | DIASTOLIC BLOOD PRESSURE: 74 MMHG | HEIGHT: 66 IN | WEIGHT: 170 LBS | RESPIRATION RATE: 16 BRPM | TEMPERATURE: 97.7 F

## 2021-11-29 DIAGNOSIS — Z20.822 EXPOSURE TO 2019 NOVEL CORONAVIRUS: ICD-10-CM

## 2021-11-29 DIAGNOSIS — Z11.59 NEED FOR HEPATITIS C SCREENING TEST: ICD-10-CM

## 2021-11-29 DIAGNOSIS — Z00.00 ROUTINE GENERAL MEDICAL EXAMINATION AT A HEALTH CARE FACILITY: Primary | ICD-10-CM

## 2021-11-29 DIAGNOSIS — Z12.83 SKIN CANCER SCREENING: ICD-10-CM

## 2021-11-29 DIAGNOSIS — Z80.0 FH: COLON CANCER: ICD-10-CM

## 2021-11-29 DIAGNOSIS — Z80.3 FH: BREAST CANCER: ICD-10-CM

## 2021-11-29 LAB
HBA1C MFR BLD: 5 % (ref 0–5.6)
HCV AB SERPL QL IA: NONREACTIVE

## 2021-11-29 PROCEDURE — 86803 HEPATITIS C AB TEST: CPT | Performed by: FAMILY MEDICINE

## 2021-11-29 PROCEDURE — 90471 IMMUNIZATION ADMIN: CPT | Performed by: FAMILY MEDICINE

## 2021-11-29 PROCEDURE — 90686 IIV4 VACC NO PRSV 0.5 ML IM: CPT | Performed by: FAMILY MEDICINE

## 2021-11-29 PROCEDURE — 99000 SPECIMEN HANDLING OFFICE-LAB: CPT | Performed by: FAMILY MEDICINE

## 2021-11-29 PROCEDURE — 99395 PREV VISIT EST AGE 18-39: CPT | Mod: 25 | Performed by: FAMILY MEDICINE

## 2021-11-29 PROCEDURE — 36415 COLL VENOUS BLD VENIPUNCTURE: CPT | Performed by: FAMILY MEDICINE

## 2021-11-29 PROCEDURE — 86769 SARS-COV-2 COVID-19 ANTIBODY: CPT | Mod: 90 | Performed by: FAMILY MEDICINE

## 2021-11-29 PROCEDURE — 80061 LIPID PANEL: CPT | Performed by: FAMILY MEDICINE

## 2021-11-29 PROCEDURE — 83036 HEMOGLOBIN GLYCOSYLATED A1C: CPT | Performed by: FAMILY MEDICINE

## 2021-11-29 SDOH — HEALTH STABILITY: PHYSICAL HEALTH: ON AVERAGE, HOW MANY MINUTES DO YOU ENGAGE IN EXERCISE AT THIS LEVEL?: 30 MIN

## 2021-11-29 SDOH — ECONOMIC STABILITY: FOOD INSECURITY: WITHIN THE PAST 12 MONTHS, THE FOOD YOU BOUGHT JUST DIDN'T LAST AND YOU DIDN'T HAVE MONEY TO GET MORE.: NEVER TRUE

## 2021-11-29 SDOH — ECONOMIC STABILITY: FOOD INSECURITY: WITHIN THE PAST 12 MONTHS, YOU WORRIED THAT YOUR FOOD WOULD RUN OUT BEFORE YOU GOT MONEY TO BUY MORE.: NEVER TRUE

## 2021-11-29 SDOH — ECONOMIC STABILITY: TRANSPORTATION INSECURITY
IN THE PAST 12 MONTHS, HAS THE LACK OF TRANSPORTATION KEPT YOU FROM MEDICAL APPOINTMENTS OR FROM GETTING MEDICATIONS?: NO

## 2021-11-29 SDOH — ECONOMIC STABILITY: TRANSPORTATION INSECURITY
IN THE PAST 12 MONTHS, HAS LACK OF TRANSPORTATION KEPT YOU FROM MEETINGS, WORK, OR FROM GETTING THINGS NEEDED FOR DAILY LIVING?: NO

## 2021-11-29 SDOH — ECONOMIC STABILITY: INCOME INSECURITY: IN THE LAST 12 MONTHS, WAS THERE A TIME WHEN YOU WERE NOT ABLE TO PAY THE MORTGAGE OR RENT ON TIME?: NO

## 2021-11-29 SDOH — HEALTH STABILITY: PHYSICAL HEALTH: ON AVERAGE, HOW MANY DAYS PER WEEK DO YOU ENGAGE IN MODERATE TO STRENUOUS EXERCISE (LIKE A BRISK WALK)?: 3 DAYS

## 2021-11-29 SDOH — ECONOMIC STABILITY: INCOME INSECURITY: HOW HARD IS IT FOR YOU TO PAY FOR THE VERY BASICS LIKE FOOD, HOUSING, MEDICAL CARE, AND HEATING?: NOT VERY HARD

## 2021-11-29 ASSESSMENT — ENCOUNTER SYMPTOMS
JOINT SWELLING: 0
HEARTBURN: 0
MYALGIAS: 0
DIARRHEA: 0
CHILLS: 0
NAUSEA: 0
PALPITATIONS: 0
FREQUENCY: 0
HEADACHES: 0
HEMATURIA: 0
ABDOMINAL PAIN: 0
EYE PAIN: 0
HEMATOCHEZIA: 0
WEAKNESS: 0
SHORTNESS OF BREATH: 0
DIZZINESS: 0
SORE THROAT: 0
COUGH: 0
BREAST MASS: 0
CONSTIPATION: 0
FEVER: 0
DYSURIA: 0
PARESTHESIAS: 0
ARTHRALGIAS: 0
NERVOUS/ANXIOUS: 0

## 2021-11-29 ASSESSMENT — SOCIAL DETERMINANTS OF HEALTH (SDOH)
IN A TYPICAL WEEK, HOW MANY TIMES DO YOU TALK ON THE PHONE WITH FAMILY, FRIENDS, OR NEIGHBORS?: MORE THAN THREE TIMES A WEEK
DO YOU BELONG TO ANY CLUBS OR ORGANIZATIONS SUCH AS CHURCH GROUPS UNIONS, FRATERNAL OR ATHLETIC GROUPS, OR SCHOOL GROUPS?: NO
HOW OFTEN DO YOU GET TOGETHER WITH FRIENDS OR RELATIVES?: TWICE A WEEK
HOW OFTEN DO YOU ATTEND CHURCH OR RELIGIOUS SERVICES?: 1 TO 4 TIMES PER YEAR

## 2021-11-29 ASSESSMENT — LIFESTYLE VARIABLES
HOW MANY STANDARD DRINKS CONTAINING ALCOHOL DO YOU HAVE ON A TYPICAL DAY: 1 OR 2
HOW OFTEN DO YOU HAVE A DRINK CONTAINING ALCOHOL: 2-3 TIMES A WEEK
HOW OFTEN DO YOU HAVE SIX OR MORE DRINKS ON ONE OCCASION: LESS THAN MONTHLY

## 2021-11-29 ASSESSMENT — MIFFLIN-ST. JEOR: SCORE: 1503.89

## 2021-11-29 NOTE — PROGRESS NOTES
SUBJECTIVE:   CC: Claudette Gilbert is an 31 year old woman who presents for preventive health visit.       Patient has been advised of split billing requirements and indicates understanding: Yes     Healthy Habits:    Getting at least 3 servings of Calcium per day:  Yes    Bi-annual eye exam:  NO    Dental care twice a year:  Yes    Sleep apnea or symptoms of sleep apnea:  None    Diet:  Regular (no restrictions) and Breakfast skipped    Frequency of exercise:  2-3 days/week    Duration of exercise:  30-45 minutes    Taking medications regularly:  Not Applicable    Medication side effects:  Not applicable and None    PHQ-2 Total Score:    Additional concerns today:  No      Would like COVID Luisana testing. Daughter and father in law both had COVID, she had exposure. No symptoms.       Today's PHQ-2 Score:   PHQ-2 ( 1999 Pfizer) 11/29/2021   Q1: Little interest or pleasure in doing things 0   Q2: Feeling down, depressed or hopeless 0   PHQ-2 Score 0   Q1: Little interest or pleasure in doing things Not at all   Q2: Feeling down, depressed or hopeless Not at all   PHQ-2 Score 0       Abuse: Current or Past (Physical, Sexual or Emotional) - No  Do you feel safe in your environment? Yes    Have you ever done Advance Care Planning? (For example, a Health Directive, POLST, or a discussion with a medical provider or your loved ones about your wishes): No, advance care planning information given to patient to review.  Patient plans to discuss their wishes with loved ones or provider.      Social History     Tobacco Use     Smoking status: Never Smoker     Smokeless tobacco: Never Used   Substance Use Topics     Alcohol use: Yes     Comment: 2 a week         Alcohol Use 11/29/2021   Prescreen: >3 drinks/day or >7 drinks/week? No       Reviewed orders with patient.  Reviewed health maintenance and updated orders accordingly - Yes  Patient Active Problem List   Diagnosis     FH: breast cancer     FH: colon cancer     Past  Surgical History:   Procedure Laterality Date     GALLBLADDER SURGERY  2017       Social History     Tobacco Use     Smoking status: Never Smoker     Smokeless tobacco: Never Used   Substance Use Topics     Alcohol use: Yes     Comment: 2 a week     Family History   Problem Relation Age of Onset     Hypertension Father            Breast Cancer Screening:    FHS-7:   Breast CA Risk Assessment (FHS-7) 11/29/2021   Did any of your first-degree relatives have breast or ovarian cancer? No   Did any of your relatives have bilateral breast cancer? Unknown   Did any man in your family have breast cancer? No   Did any woman in your family have breast and ovarian cancer? Yes   Did any woman in your family have breast cancer before age 50 y? No   Do you have 2 or more relatives with breast and/or ovarian cancer? No   Do you have 2 or more relatives with breast and/or bowel cancer? Yes       Patient under 40 years of age: Routine Mammogram Screening not recommended.   Pertinent mammograms are reviewed under the imaging tab.    History of abnormal Pap smear: NO - age 30- 65 PAP every 3 years recommended     Reviewed and updated as needed this visit by clinical staff  Tobacco  Allergies  Meds   Med Hx  Surg Hx  Fam Hx         Reviewed and updated as needed this visit by Provider    Meds                Review of Systems   Constitutional: Negative for chills and fever.   HENT: Negative for congestion, ear pain, hearing loss and sore throat.    Eyes: Negative for pain and visual disturbance.   Respiratory: Negative for cough and shortness of breath.    Cardiovascular: Negative for chest pain, palpitations and peripheral edema.   Gastrointestinal: Negative for abdominal pain, constipation, diarrhea, heartburn, hematochezia and nausea.   Breasts:  Negative for tenderness, breast mass and discharge.   Genitourinary: Negative for dysuria, frequency, genital sores, hematuria, pelvic pain, urgency, vaginal bleeding and vaginal  "discharge.   Musculoskeletal: Negative for arthralgias, joint swelling and myalgias.   Skin: Negative for rash.   Neurological: Negative for dizziness, weakness, headaches and paresthesias.   Psychiatric/Behavioral: Negative for mood changes. The patient is not nervous/anxious.         OBJECTIVE:   /74   Pulse 70   Temp 97.7  F (36.5  C) (Oral)   Resp 16   Ht 1.67 m (5' 5.75\")   Wt 77.1 kg (170 lb)   LMP 11/22/2021 (Approximate)   SpO2 99%   BMI 27.65 kg/m    Physical Exam  GENERAL: healthy, alert and no distress  EYES: Eyes grossly normal to inspection, PERRL and conjunctivae and sclerae normal  HENT: ear canals and TM's normal, nose and mouth without ulcers or lesions  NECK: no adenopathy, no asymmetry, masses, or scars and thyroid normal to palpation  RESP: lungs clear to auscultation - no rales, rhonchi or wheezes  BREAST: normal without masses, tenderness or nipple discharge and no palpable axillary masses or adenopathy  CV: regular rate and rhythm, normal S1 S2, no S3 or S4, no murmur, click or rub, no peripheral edema and peripheral pulses strong  ABDOMEN: soft, nontender, no hepatosplenomegaly, no masses and bowel sounds normal  MS: no gross musculoskeletal defects noted, no edema  SKIN: no suspicious lesions or rashes  NEURO: Normal strength and tone, mentation intact and speech normal  PSYCH: mentation appears normal, affect normal/bright    Diagnostic Test Results:  Labs reviewed in Epic    ASSESSMENT/PLAN:     1. Routine general medical examination at a health care facility  - INFLUENZA VACCINE IM > 6 MONTHS VALENT IIV4 (AFLURIA/FLUZONE)  - Lipid panel reflex to direct LDL Fasting; Future  - Hemoglobin A1c; Future    2. Need for hepatitis C screening test  - Hepatitis C Screen Reflex to HCV RNA Quant and Genotype; Future    3. Skin cancer screening  - Adult Dermatology Referral; Future    4. FH: breast cancer - no need for early screen at this point    5. FH: colon cancer - no need for " "early screen at this point    6. Exposure to 2019 novel coronavirus  - COVID-19 Benji RBD Luisana & Titer Reflex; Future      Patient has been advised of split billing requirements and indicates understanding: Yes     COUNSELING:  Reviewed preventive health counseling, as reflected in patient instructions    Estimated body mass index is 27.65 kg/m  as calculated from the following:    Height as of this encounter: 1.67 m (5' 5.75\").    Weight as of this encounter: 77.1 kg (170 lb).      She reports that she has never smoked. She has never used smokeless tobacco.      Melanie Contreras MD  [facility]  "

## 2021-11-30 LAB
CHOLEST SERPL-MCNC: 284 MG/DL
FASTING STATUS PATIENT QL REPORTED: YES
HDLC SERPL-MCNC: 46 MG/DL
LDLC SERPL CALC-MCNC: 196 MG/DL
NONHDLC SERPL-MCNC: 238 MG/DL
SARS-COV-2 AB SERPL IA-ACNC: <0.4 U/ML
SARS-COV-2 AB SERPL-IMP: NEGATIVE
TRIGL SERPL-MCNC: 211 MG/DL

## 2021-12-01 ENCOUNTER — TELEPHONE (OUTPATIENT)
Dept: FAMILY MEDICINE | Facility: CLINIC | Age: 31
End: 2021-12-01
Payer: COMMERCIAL

## 2021-12-01 NOTE — TELEPHONE ENCOUNTER
Attempted to reach patient, LVMTCB. Need to relay provider result note below.     Dannie ARRIAGA RN

## 2021-12-01 NOTE — TELEPHONE ENCOUNTER
"----- Message from Melanie Contreras MD sent at 11/30/2021  6:17 PM CST -----  Please call -     Cholesterol levels excessively high. This is very likely a familial condition. Let's see what she can do with lifestyle measures first. If still high next year, may need to talk medication.     COVID antibodies negative. She should get the vaccine.     Hep c negative.     Diabetes screen negative.     What lifestyle changes can I make to help improve my cholesterol levels?     Exercise regularly.   Exercise can raise HDL cholesterol levels and reduce levels of LDL cholesterol and triglycerides. If you haven't been exercising, try to work up to 30 minutes, 4 to 6 times a week.     Lose weight if you are overweight.   Being overweight can raise your cholesterol levels. Losing weight, even just 5 or 10 pounds, can lower your total cholesterol, LDL cholesterol and triglyceride levels.     Eat a heart-healthy diet.   Eat plenty of fresh fruits and vegetables. Fruits and vegetables are naturally low in fat. Not only do they add flavor and variety to your diet, but they are also the best source of fiber, vitamins and minerals for your body. Aim for 5 cups of fruits and vegetables every day, not counting potatoes, corn and rice. Potatoes, corn and rice count as carbohydrates.     Pick \"good\" fats over \"bad\" fats. Fat is part of a healthy diet, but you need to know the difference between \"bad\" fats and \"good\" fats. \"Bad\" fats, such as saturated and trans fats, are found in foods such as butter; coconut and palm oil; saturated or partially hydrogenated vegetable fats such as shortening and margarine; animal fats in meats; and fats in whole milk dairy products. Limit the amount of saturated fat in your diet, and avoid trans fat completely. Unsaturated fat is the \"good\" fat. Most fats in fish, vegetables, grains and tree nuts are unsaturated. Try to eat unsaturated fat in place of saturated fat. For example, you can use olive oil " "or canola oil in cooking instead of butter.     Use healthier cooking methods. Baking, broiling and roasting are the healthiest ways to prepare meat, poultry and other foods. Trim any outside fat or skin before cooking. Lean cuts can be pan-broiled or stir-fried. Use either a nonstick pan or nonstick cooking spray instead of adding fats such as butter or margarine. When eating out, ask how food is prepared. You can request that your food be baked, broiled or roasted, rather than fried.   Look for other sources of protein. Fish, dry beans, tree nuts, peas and lentils offer protein, nutrients and fiber without the cholesterol and saturated fats that meats have. Consider eating one \"meatless\" meal each week. Try substituting beans for meat in a favorite recipe, such as lasagna or chili. Snack on a handful of almonds or pecans. Soy is also an excellent source of protein. Good examples of soy include soy milk, edamame (green soy beans), tofu and soy protein shakes.     Get more fiber in your diet. Add good sources of fiber to your meals. Examples include fruits, vegetables, whole grains (such as oat bran, whole and rolled oats and barley), legumes (such as beans and peas) and nuts and seeds (such as ground flax seed). In addition to fiber, whole grains supply B-vitamins and important nutrients not found in foods made with white flour.     Eat more fish. Fish are an excellent source of omega-3 fatty acids. Wild-caught oily fish, such as salmon, tuna, mackerel and sardines, are the best sources of omega-3s, but all fish contain some amount of this beneficial fatty acid. Aim for 2 6-oz servings each week.         "

## 2021-12-01 NOTE — TELEPHONE ENCOUNTER
Received call back from patient, relayed provider result note below. Patient verbalized understanding and agreed with plan. No further questions or concerns at this time.     Dannie ARRIAGA RN

## 2022-03-23 ENCOUNTER — E-VISIT (OUTPATIENT)
Dept: URGENT CARE | Facility: CLINIC | Age: 32
End: 2022-03-23
Payer: COMMERCIAL

## 2022-03-23 DIAGNOSIS — H10.31 ACUTE CONJUNCTIVITIS OF RIGHT EYE, UNSPECIFIED ACUTE CONJUNCTIVITIS TYPE: Primary | ICD-10-CM

## 2022-03-23 PROCEDURE — 99421 OL DIG E/M SVC 5-10 MIN: CPT | Performed by: PHYSICIAN ASSISTANT

## 2022-03-23 RX ORDER — POLYMYXIN B SULFATE AND TRIMETHOPRIM 1; 10000 MG/ML; [USP'U]/ML
1-2 SOLUTION OPHTHALMIC EVERY 4 HOURS
Qty: 10 ML | Refills: 0 | Status: SHIPPED | OUTPATIENT
Start: 2022-03-23 | End: 2022-03-30

## 2022-08-03 ENCOUNTER — LAB REQUISITION (OUTPATIENT)
Dept: LAB | Facility: CLINIC | Age: 32
End: 2022-08-03
Payer: COMMERCIAL

## 2022-08-03 DIAGNOSIS — Z87.59 PERSONAL HISTORY OF OTHER COMPLICATIONS OF PREGNANCY, CHILDBIRTH AND THE PUERPERIUM: ICD-10-CM

## 2022-08-03 LAB
ABO/RH(D): NORMAL
ANTIBODY SCREEN: NEGATIVE
BASOPHILS # BLD AUTO: 0 10E3/UL (ref 0–0.2)
BASOPHILS NFR BLD AUTO: 0 %
EOSINOPHIL # BLD AUTO: 0.1 10E3/UL (ref 0–0.7)
EOSINOPHIL NFR BLD AUTO: 1 %
ERYTHROCYTE [DISTWIDTH] IN BLOOD BY AUTOMATED COUNT: 12.2 % (ref 10–15)
HCT VFR BLD AUTO: 41.4 % (ref 35–47)
HGB BLD-MCNC: 14.1 G/DL (ref 11.7–15.7)
IMM GRANULOCYTES # BLD: 0 10E3/UL
IMM GRANULOCYTES NFR BLD: 0 %
LYMPHOCYTES # BLD AUTO: 1.3 10E3/UL (ref 0.8–5.3)
LYMPHOCYTES NFR BLD AUTO: 23 %
MCH RBC QN AUTO: 29.7 PG (ref 26.5–33)
MCHC RBC AUTO-ENTMCNC: 34.1 G/DL (ref 31.5–36.5)
MCV RBC AUTO: 87 FL (ref 78–100)
MONOCYTES # BLD AUTO: 0.4 10E3/UL (ref 0–1.3)
MONOCYTES NFR BLD AUTO: 7 %
NEUTROPHILS # BLD AUTO: 3.8 10E3/UL (ref 1.6–8.3)
NEUTROPHILS NFR BLD AUTO: 69 %
NRBC # BLD AUTO: 0 10E3/UL
NRBC BLD AUTO-RTO: 0 /100
PLATELET # BLD AUTO: 164 10E3/UL (ref 150–450)
RBC # BLD AUTO: 4.74 10E6/UL (ref 3.8–5.2)
SPECIMEN EXPIRATION DATE: NORMAL
WBC # BLD AUTO: 5.5 10E3/UL (ref 4–11)

## 2022-08-03 PROCEDURE — 86592 SYPHILIS TEST NON-TREP QUAL: CPT | Mod: ORL | Performed by: OBSTETRICS & GYNECOLOGY

## 2022-08-03 PROCEDURE — 87491 CHLMYD TRACH DNA AMP PROBE: CPT | Mod: ORL | Performed by: OBSTETRICS & GYNECOLOGY

## 2022-08-03 PROCEDURE — 86901 BLOOD TYPING SEROLOGIC RH(D): CPT | Mod: ORL | Performed by: OBSTETRICS & GYNECOLOGY

## 2022-08-03 PROCEDURE — 87086 URINE CULTURE/COLONY COUNT: CPT | Mod: ORL | Performed by: OBSTETRICS & GYNECOLOGY

## 2022-08-03 PROCEDURE — 86762 RUBELLA ANTIBODY: CPT | Mod: ORL | Performed by: OBSTETRICS & GYNECOLOGY

## 2022-08-03 PROCEDURE — 87389 HIV-1 AG W/HIV-1&-2 AB AG IA: CPT | Mod: ORL | Performed by: OBSTETRICS & GYNECOLOGY

## 2022-08-03 PROCEDURE — 85025 COMPLETE CBC W/AUTO DIFF WBC: CPT | Mod: ORL | Performed by: OBSTETRICS & GYNECOLOGY

## 2022-08-03 PROCEDURE — 87340 HEPATITIS B SURFACE AG IA: CPT | Mod: ORL | Performed by: OBSTETRICS & GYNECOLOGY

## 2022-08-03 PROCEDURE — 86803 HEPATITIS C AB TEST: CPT | Mod: ORL | Performed by: OBSTETRICS & GYNECOLOGY

## 2022-08-04 LAB
C TRACH DNA SPEC QL PROBE+SIG AMP: NEGATIVE
HBV SURFACE AG SERPL QL IA: NONREACTIVE
HCV AB SERPL QL IA: NONREACTIVE
HEPATITIS B SURFACE ANTIGEN (EXTERNAL): NEGATIVE
HIV 1+2 AB+HIV1 P24 AG SERPL QL IA: NONREACTIVE
N GONORRHOEA DNA SPEC QL NAA+PROBE: NEGATIVE
RPR SER QL: NONREACTIVE
RUBELLA ANTIBODY IGG (EXTERNAL): NORMAL
RUBV IGG SERPL QL IA: 8.37 INDEX
RUBV IGG SERPL QL IA: POSITIVE
TREPONEMA PALLIDUM ANTIBODY (EXTERNAL): NEGATIVE
VDRL (SYPHILIS) (EXTERNAL): NONREACTIVE

## 2022-08-05 LAB — BACTERIA UR CULT: NORMAL

## 2022-10-15 ENCOUNTER — HEALTH MAINTENANCE LETTER (OUTPATIENT)
Age: 32
End: 2022-10-15

## 2022-12-12 ENCOUNTER — LAB REQUISITION (OUTPATIENT)
Dept: LAB | Facility: CLINIC | Age: 32
End: 2022-12-12
Payer: COMMERCIAL

## 2022-12-12 DIAGNOSIS — Z36.89 ENCOUNTER FOR OTHER SPECIFIED ANTENATAL SCREENING: ICD-10-CM

## 2022-12-12 LAB
GLUCOSE 1H P 50 G GLC PO SERPL-MCNC: 139 MG/DL (ref 70–129)
HGB BLD-MCNC: 12.5 G/DL (ref 11.7–15.7)
T PALLIDUM AB SER QL: NONREACTIVE

## 2022-12-12 PROCEDURE — 82950 GLUCOSE TEST: CPT | Mod: ORL | Performed by: OBSTETRICS & GYNECOLOGY

## 2022-12-12 PROCEDURE — 85018 HEMOGLOBIN: CPT | Mod: ORL | Performed by: OBSTETRICS & GYNECOLOGY

## 2022-12-12 PROCEDURE — 86780 TREPONEMA PALLIDUM: CPT | Mod: ORL | Performed by: OBSTETRICS & GYNECOLOGY

## 2023-01-06 ENCOUNTER — HOSPITAL ENCOUNTER (OUTPATIENT)
Facility: CLINIC | Age: 33
Discharge: HOME | End: 2023-01-06
Attending: OBSTETRICS & GYNECOLOGY | Admitting: OBSTETRICS & GYNECOLOGY
Payer: COMMERCIAL

## 2023-01-06 ENCOUNTER — HOSPITAL ENCOUNTER (OUTPATIENT)
Facility: CLINIC | Age: 33
End: 2023-01-06
Admitting: OBSTETRICS & GYNECOLOGY
Payer: COMMERCIAL

## 2023-01-06 ENCOUNTER — MEDICAL CORRESPONDENCE (OUTPATIENT)
Dept: HEALTH INFORMATION MANAGEMENT | Facility: CLINIC | Age: 33
End: 2023-01-06

## 2023-01-06 VITALS
TEMPERATURE: 99.6 F | BODY MASS INDEX: 27.44 KG/M2 | DIASTOLIC BLOOD PRESSURE: 83 MMHG | HEIGHT: 66 IN | RESPIRATION RATE: 18 BRPM | SYSTOLIC BLOOD PRESSURE: 133 MMHG | HEART RATE: 96 BPM

## 2023-01-06 PROCEDURE — 96374 THER/PROPH/DIAG INJ IV PUSH: CPT

## 2023-01-06 PROCEDURE — 96360 HYDRATION IV INFUSION INIT: CPT

## 2023-01-06 PROCEDURE — 999N000105 HC STATISTIC NO DOCUMENTATION TO SUPPORT CHARGE

## 2023-01-06 PROCEDURE — 250N000011 HC RX IP 250 OP 636: Performed by: OBSTETRICS & GYNECOLOGY

## 2023-01-06 RX ORDER — ONDANSETRON 2 MG/ML
4 INJECTION INTRAMUSCULAR; INTRAVENOUS ONCE
Status: COMPLETED | OUTPATIENT
Start: 2023-01-06 | End: 2023-01-06

## 2023-01-06 RX ORDER — PRENATAL VIT/IRON FUM/FOLIC AC 27MG-0.8MG
1 TABLET ORAL DAILY
COMMUNITY

## 2023-01-06 RX ORDER — DEXTROSE, SODIUM CHLORIDE, SODIUM LACTATE, POTASSIUM CHLORIDE, AND CALCIUM CHLORIDE 5; .6; .31; .03; .02 G/100ML; G/100ML; G/100ML; G/100ML; G/100ML
INJECTION, SOLUTION INTRAVENOUS CONTINUOUS
Status: DISCONTINUED | OUTPATIENT
Start: 2023-01-06 | End: 2023-01-06 | Stop reason: HOSPADM

## 2023-01-06 RX ADMIN — SODIUM CHLORIDE, SODIUM LACTATE, POTASSIUM CHLORIDE, CALCIUM CHLORIDE AND DEXTROSE MONOHYDRATE: 5; 600; 310; 30; 20 INJECTION, SOLUTION INTRAVENOUS at 11:10

## 2023-01-06 RX ADMIN — ONDANSETRON 4 MG: 2 INJECTION INTRAMUSCULAR; INTRAVENOUS at 11:18

## 2023-01-06 ASSESSMENT — ACTIVITIES OF DAILY LIVING (ADL): ADLS_ACUITY_SCORE: 31

## 2023-01-06 NOTE — PROVIDER NOTIFICATION
01/06/23 1158   Provider Notification   Provider Name/Title Dr. Tam   Method of Notification Electronic Page;Phone  ( stated when pt is done with infusion ok to be discharged.)

## 2023-01-06 NOTE — PLAN OF CARE
"Data: Patient assessed in the [facility] for nausea and vomiting.  Cervical exam not examined.  Membranes intact.  Contractions/uterine assessment not monitored. Pt stated \"i'm feeling better, and not shakey anymore.\"  Action:  Presumed adequate fetal oxygenation documented (see flow record). Discharge instructions reviewed.  Patient instructed to report change in fetal movement, vaginal leaking of fluid or bleeding, abdominal pain, or any concerns related to the pregnancy to her nurse/physician.    Response: Orders to discharge home per Luma Tam.  Patient verbalized understanding of education and verbalized agreement with plan. Discharged to home at 1212.     "

## 2023-01-06 NOTE — PLAN OF CARE
Data: Patient presented to Birthplace: 2023 10:46 AM.  Reason for maternal/fetal assessment is nausea and vomiting. Patient reports HAVING n/v since Wednesday, sent in today for iv fluids and zofran.  Patient is a .  Prenatal record reviewed. Pregnancy has been uncomplicated..  Gestational Age 31w6d. VSS. Fetal movement active. Patient denies uterine contractions, leaking of vaginal fluid/rupture of membranes, vaginal bleeding, abdominal pain, pelvic pressure, headache, visual disturbances, epigastric or URQ pain, significant edema. Support person is not present.   Action: Verbal consent for EFM. Triage assessment completed. Bill of rights reviewed.  Response: Patient verbalized agreement with plan. Dr Luma Tam gave orders prior to pt arriving, for IV d5LR bolus and iv zofran.

## 2023-02-09 ENCOUNTER — LAB REQUISITION (OUTPATIENT)
Dept: LAB | Facility: CLINIC | Age: 33
End: 2023-02-09
Payer: COMMERCIAL

## 2023-02-09 DIAGNOSIS — Z34.83 ENCOUNTER FOR SUPERVISION OF OTHER NORMAL PREGNANCY, THIRD TRIMESTER: ICD-10-CM

## 2023-02-09 PROCEDURE — 87653 STREP B DNA AMP PROBE: CPT | Mod: ORL | Performed by: OBSTETRICS & GYNECOLOGY

## 2023-02-10 LAB
GP B STREP DNA SPEC QL NAA+PROBE: NEGATIVE
GROUP B STREPTOCOCCUS (EXTERNAL): NEGATIVE

## 2023-02-27 ENCOUNTER — ANESTHESIA EVENT (OUTPATIENT)
Dept: OBGYN | Facility: CLINIC | Age: 33
End: 2023-02-27
Payer: COMMERCIAL

## 2023-02-27 ENCOUNTER — ANESTHESIA (OUTPATIENT)
Dept: OBGYN | Facility: CLINIC | Age: 33
End: 2023-02-27
Payer: COMMERCIAL

## 2023-02-27 ENCOUNTER — HOSPITAL ENCOUNTER (INPATIENT)
Facility: CLINIC | Age: 33
LOS: 1 days | Discharge: HOME | End: 2023-02-28
Attending: OBSTETRICS & GYNECOLOGY | Admitting: OBSTETRICS & GYNECOLOGY
Payer: COMMERCIAL

## 2023-02-27 DIAGNOSIS — Z34.90 ENCOUNTER FOR ELECTIVE INDUCTION OF LABOR: Primary | ICD-10-CM

## 2023-02-27 LAB
ABO/RH(D): NORMAL
ALBUMIN MFR UR ELPH: 12.1 MG/DL (ref 1–14)
ALT SERPL W P-5'-P-CCNC: 10 U/L (ref 10–35)
ANTIBODY SCREEN: NEGATIVE
AST SERPL W P-5'-P-CCNC: 17 U/L (ref 10–35)
CREAT UR-MCNC: 89.4 MG/DL
ERYTHROCYTE [DISTWIDTH] IN BLOOD BY AUTOMATED COUNT: 12.9 % (ref 10–15)
HCT VFR BLD AUTO: 36.3 % (ref 35–47)
HGB BLD-MCNC: 11.9 G/DL (ref 11.7–15.7)
MCH RBC QN AUTO: 28.6 PG (ref 26.5–33)
MCHC RBC AUTO-ENTMCNC: 32.8 G/DL (ref 31.5–36.5)
MCV RBC AUTO: 87 FL (ref 78–100)
PLATELET # BLD AUTO: 141 10E3/UL (ref 150–450)
PROT/CREAT 24H UR: 0.14 MG/MG CR (ref 0–0.2)
RBC # BLD AUTO: 4.16 10E6/UL (ref 3.8–5.2)
SPECIMEN EXPIRATION DATE: NORMAL
T PALLIDUM AB SER QL: NONREACTIVE
URATE SERPL-MCNC: 4.8 MG/DL (ref 2.4–5.7)
WBC # BLD AUTO: 5.8 10E3/UL (ref 4–11)

## 2023-02-27 PROCEDURE — 250N000011 HC RX IP 250 OP 636: Performed by: ANESTHESIOLOGY

## 2023-02-27 PROCEDURE — 85014 HEMATOCRIT: CPT | Performed by: OBSTETRICS & GYNECOLOGY

## 2023-02-27 PROCEDURE — 3E0R3BZ INTRODUCTION OF ANESTHETIC AGENT INTO SPINAL CANAL, PERCUTANEOUS APPROACH: ICD-10-PCS | Performed by: ANESTHESIOLOGY

## 2023-02-27 PROCEDURE — 120N000001 HC R&B MED SURG/OB

## 2023-02-27 PROCEDURE — 10907ZC DRAINAGE OF AMNIOTIC FLUID, THERAPEUTIC FROM PRODUCTS OF CONCEPTION, VIA NATURAL OR ARTIFICIAL OPENING: ICD-10-PCS | Performed by: OBSTETRICS & GYNECOLOGY

## 2023-02-27 PROCEDURE — 250N000009 HC RX 250: Performed by: OBSTETRICS & GYNECOLOGY

## 2023-02-27 PROCEDURE — 3E033VJ INTRODUCTION OF OTHER HORMONE INTO PERIPHERAL VEIN, PERCUTANEOUS APPROACH: ICD-10-PCS | Performed by: OBSTETRICS & GYNECOLOGY

## 2023-02-27 PROCEDURE — 370N000003 HC ANESTHESIA WARD SERVICE

## 2023-02-27 PROCEDURE — 250N000013 HC RX MED GY IP 250 OP 250 PS 637: Performed by: OBSTETRICS & GYNECOLOGY

## 2023-02-27 PROCEDURE — 84460 ALANINE AMINO (ALT) (SGPT): CPT | Performed by: OBSTETRICS & GYNECOLOGY

## 2023-02-27 PROCEDURE — 84450 TRANSFERASE (AST) (SGOT): CPT | Performed by: OBSTETRICS & GYNECOLOGY

## 2023-02-27 PROCEDURE — 86780 TREPONEMA PALLIDUM: CPT | Performed by: OBSTETRICS & GYNECOLOGY

## 2023-02-27 PROCEDURE — 86901 BLOOD TYPING SEROLOGIC RH(D): CPT | Performed by: OBSTETRICS & GYNECOLOGY

## 2023-02-27 PROCEDURE — 86850 RBC ANTIBODY SCREEN: CPT | Performed by: OBSTETRICS & GYNECOLOGY

## 2023-02-27 PROCEDURE — 36415 COLL VENOUS BLD VENIPUNCTURE: CPT | Performed by: OBSTETRICS & GYNECOLOGY

## 2023-02-27 PROCEDURE — 0UQGXZZ REPAIR VAGINA, EXTERNAL APPROACH: ICD-10-PCS | Performed by: OBSTETRICS & GYNECOLOGY

## 2023-02-27 PROCEDURE — 84550 ASSAY OF BLOOD/URIC ACID: CPT | Performed by: OBSTETRICS & GYNECOLOGY

## 2023-02-27 PROCEDURE — 722N000001 HC LABOR CARE VAGINAL DELIVERY SINGLE

## 2023-02-27 PROCEDURE — 84156 ASSAY OF PROTEIN URINE: CPT | Performed by: OBSTETRICS & GYNECOLOGY

## 2023-02-27 PROCEDURE — 0KQM0ZZ REPAIR PERINEUM MUSCLE, OPEN APPROACH: ICD-10-PCS | Performed by: OBSTETRICS & GYNECOLOGY

## 2023-02-27 PROCEDURE — 00HU33Z INSERTION OF INFUSION DEVICE INTO SPINAL CANAL, PERCUTANEOUS APPROACH: ICD-10-PCS | Performed by: ANESTHESIOLOGY

## 2023-02-27 PROCEDURE — 258N000003 HC RX IP 258 OP 636: Performed by: OBSTETRICS & GYNECOLOGY

## 2023-02-27 RX ORDER — TRANEXAMIC ACID 10 MG/ML
1 INJECTION, SOLUTION INTRAVENOUS EVERY 30 MIN PRN
Status: DISCONTINUED | OUTPATIENT
Start: 2023-02-27 | End: 2023-02-27 | Stop reason: HOSPADM

## 2023-02-27 RX ORDER — NALOXONE HYDROCHLORIDE 0.4 MG/ML
0.2 INJECTION, SOLUTION INTRAMUSCULAR; INTRAVENOUS; SUBCUTANEOUS
Status: DISCONTINUED | OUTPATIENT
Start: 2023-02-27 | End: 2023-02-27 | Stop reason: HOSPADM

## 2023-02-27 RX ORDER — KETOROLAC TROMETHAMINE 30 MG/ML
30 INJECTION, SOLUTION INTRAMUSCULAR; INTRAVENOUS
Status: DISCONTINUED | OUTPATIENT
Start: 2023-02-27 | End: 2023-02-28 | Stop reason: ALTCHOICE

## 2023-02-27 RX ORDER — ONDANSETRON 4 MG/1
4 TABLET, ORALLY DISINTEGRATING ORAL EVERY 6 HOURS PRN
Status: DISCONTINUED | OUTPATIENT
Start: 2023-02-27 | End: 2023-02-27 | Stop reason: HOSPADM

## 2023-02-27 RX ORDER — SODIUM CHLORIDE, SODIUM LACTATE, POTASSIUM CHLORIDE, CALCIUM CHLORIDE 600; 310; 30; 20 MG/100ML; MG/100ML; MG/100ML; MG/100ML
INJECTION, SOLUTION INTRAVENOUS CONTINUOUS PRN
Status: DISCONTINUED | OUTPATIENT
Start: 2023-02-27 | End: 2023-02-27 | Stop reason: HOSPADM

## 2023-02-27 RX ORDER — ONDANSETRON 2 MG/ML
4 INJECTION INTRAMUSCULAR; INTRAVENOUS EVERY 6 HOURS PRN
Status: DISCONTINUED | OUTPATIENT
Start: 2023-02-27 | End: 2023-02-27 | Stop reason: HOSPADM

## 2023-02-27 RX ORDER — FENTANYL CITRATE-0.9 % NACL/PF 10 MCG/ML
100 PLASTIC BAG, INJECTION (ML) INTRAVENOUS EVERY 5 MIN PRN
Status: DISCONTINUED | OUTPATIENT
Start: 2023-02-27 | End: 2023-02-27 | Stop reason: HOSPADM

## 2023-02-27 RX ORDER — METOCLOPRAMIDE 10 MG/1
10 TABLET ORAL EVERY 6 HOURS PRN
Status: DISCONTINUED | OUTPATIENT
Start: 2023-02-27 | End: 2023-02-27 | Stop reason: HOSPADM

## 2023-02-27 RX ORDER — MISOPROSTOL 200 UG/1
400 TABLET ORAL
Status: DISCONTINUED | OUTPATIENT
Start: 2023-02-27 | End: 2023-02-28 | Stop reason: HOSPADM

## 2023-02-27 RX ORDER — OXYTOCIN/0.9 % SODIUM CHLORIDE 30/500 ML
100-340 PLASTIC BAG, INJECTION (ML) INTRAVENOUS CONTINUOUS PRN
Status: DISCONTINUED | OUTPATIENT
Start: 2023-02-27 | End: 2023-02-28 | Stop reason: ALTCHOICE

## 2023-02-27 RX ORDER — LIDOCAINE 40 MG/G
CREAM TOPICAL
Status: DISCONTINUED | OUTPATIENT
Start: 2023-02-27 | End: 2023-02-27 | Stop reason: HOSPADM

## 2023-02-27 RX ORDER — MISOPROSTOL 200 UG/1
800 TABLET ORAL
Status: DISCONTINUED | OUTPATIENT
Start: 2023-02-27 | End: 2023-02-27 | Stop reason: HOSPADM

## 2023-02-27 RX ORDER — NALBUPHINE HYDROCHLORIDE 10 MG/ML
2.5-5 INJECTION, SOLUTION INTRAMUSCULAR; INTRAVENOUS; SUBCUTANEOUS EVERY 6 HOURS PRN
Status: DISCONTINUED | OUTPATIENT
Start: 2023-02-27 | End: 2023-02-28 | Stop reason: HOSPADM

## 2023-02-27 RX ORDER — CARBOPROST TROMETHAMINE 250 UG/ML
250 INJECTION, SOLUTION INTRAMUSCULAR
Status: DISCONTINUED | OUTPATIENT
Start: 2023-02-27 | End: 2023-02-27 | Stop reason: HOSPADM

## 2023-02-27 RX ORDER — OXYTOCIN 10 [USP'U]/ML
10 INJECTION, SOLUTION INTRAMUSCULAR; INTRAVENOUS
Status: DISCONTINUED | OUTPATIENT
Start: 2023-02-27 | End: 2023-02-27 | Stop reason: HOSPADM

## 2023-02-27 RX ORDER — DOCUSATE SODIUM 100 MG/1
100 CAPSULE, LIQUID FILLED ORAL DAILY
Status: DISCONTINUED | OUTPATIENT
Start: 2023-02-27 | End: 2023-02-28 | Stop reason: HOSPADM

## 2023-02-27 RX ORDER — FENTANYL CITRATE 50 UG/ML
50 INJECTION, SOLUTION INTRAMUSCULAR; INTRAVENOUS EVERY 30 MIN PRN
Status: DISCONTINUED | OUTPATIENT
Start: 2023-02-27 | End: 2023-02-27 | Stop reason: HOSPADM

## 2023-02-27 RX ORDER — MODIFIED LANOLIN
OINTMENT (GRAM) TOPICAL
Status: DISCONTINUED | OUTPATIENT
Start: 2023-02-27 | End: 2023-02-28 | Stop reason: HOSPADM

## 2023-02-27 RX ORDER — OXYTOCIN 10 [USP'U]/ML
10 INJECTION, SOLUTION INTRAMUSCULAR; INTRAVENOUS
Status: DISCONTINUED | OUTPATIENT
Start: 2023-02-27 | End: 2023-02-28 | Stop reason: HOSPADM

## 2023-02-27 RX ORDER — IBUPROFEN 800 MG/1
800 TABLET, FILM COATED ORAL
Status: DISCONTINUED | OUTPATIENT
Start: 2023-02-27 | End: 2023-02-28 | Stop reason: ALTCHOICE

## 2023-02-27 RX ORDER — BUPIVACAINE HYDROCHLORIDE 2.5 MG/ML
INJECTION, SOLUTION EPIDURAL; INFILTRATION; INTRACAUDAL PRN
Status: DISCONTINUED | OUTPATIENT
Start: 2023-02-27 | End: 2023-02-27

## 2023-02-27 RX ORDER — CARBOPROST TROMETHAMINE 250 UG/ML
250 INJECTION, SOLUTION INTRAMUSCULAR
Status: DISCONTINUED | OUTPATIENT
Start: 2023-02-27 | End: 2023-02-28 | Stop reason: HOSPADM

## 2023-02-27 RX ORDER — OXYTOCIN/0.9 % SODIUM CHLORIDE 30/500 ML
1-24 PLASTIC BAG, INJECTION (ML) INTRAVENOUS CONTINUOUS
Status: DISCONTINUED | OUTPATIENT
Start: 2023-02-27 | End: 2023-02-27 | Stop reason: HOSPADM

## 2023-02-27 RX ORDER — OXYTOCIN 10 [USP'U]/ML
10 INJECTION, SOLUTION INTRAMUSCULAR; INTRAVENOUS
Status: DISCONTINUED | OUTPATIENT
Start: 2023-02-27 | End: 2023-02-28 | Stop reason: ALTCHOICE

## 2023-02-27 RX ORDER — BISACODYL 10 MG
10 SUPPOSITORY, RECTAL RECTAL DAILY PRN
Status: DISCONTINUED | OUTPATIENT
Start: 2023-02-27 | End: 2023-02-28 | Stop reason: HOSPADM

## 2023-02-27 RX ORDER — TRANEXAMIC ACID 10 MG/ML
1 INJECTION, SOLUTION INTRAVENOUS EVERY 30 MIN PRN
Status: DISCONTINUED | OUTPATIENT
Start: 2023-02-27 | End: 2023-02-28 | Stop reason: HOSPADM

## 2023-02-27 RX ORDER — MISOPROSTOL 200 UG/1
400 TABLET ORAL
Status: DISCONTINUED | OUTPATIENT
Start: 2023-02-27 | End: 2023-02-27 | Stop reason: HOSPADM

## 2023-02-27 RX ORDER — PROCHLORPERAZINE MALEATE 10 MG
10 TABLET ORAL EVERY 6 HOURS PRN
Status: DISCONTINUED | OUTPATIENT
Start: 2023-02-27 | End: 2023-02-27 | Stop reason: HOSPADM

## 2023-02-27 RX ORDER — OXYTOCIN/0.9 % SODIUM CHLORIDE 30/500 ML
340 PLASTIC BAG, INJECTION (ML) INTRAVENOUS CONTINUOUS PRN
Status: DISCONTINUED | OUTPATIENT
Start: 2023-02-27 | End: 2023-02-27 | Stop reason: HOSPADM

## 2023-02-27 RX ORDER — OXYTOCIN/0.9 % SODIUM CHLORIDE 30/500 ML
340 PLASTIC BAG, INJECTION (ML) INTRAVENOUS CONTINUOUS PRN
Status: DISCONTINUED | OUTPATIENT
Start: 2023-02-27 | End: 2023-02-28 | Stop reason: HOSPADM

## 2023-02-27 RX ORDER — HYDROCORTISONE 25 MG/G
CREAM TOPICAL 3 TIMES DAILY PRN
Status: DISCONTINUED | OUTPATIENT
Start: 2023-02-27 | End: 2023-02-28 | Stop reason: HOSPADM

## 2023-02-27 RX ORDER — NALOXONE HYDROCHLORIDE 0.4 MG/ML
0.4 INJECTION, SOLUTION INTRAMUSCULAR; INTRAVENOUS; SUBCUTANEOUS
Status: DISCONTINUED | OUTPATIENT
Start: 2023-02-27 | End: 2023-02-27 | Stop reason: HOSPADM

## 2023-02-27 RX ORDER — METHYLERGONOVINE MALEATE 0.2 MG/ML
200 INJECTION INTRAVENOUS
Status: DISCONTINUED | OUTPATIENT
Start: 2023-02-27 | End: 2023-02-27 | Stop reason: HOSPADM

## 2023-02-27 RX ORDER — METOCLOPRAMIDE HYDROCHLORIDE 5 MG/ML
10 INJECTION INTRAMUSCULAR; INTRAVENOUS EVERY 6 HOURS PRN
Status: DISCONTINUED | OUTPATIENT
Start: 2023-02-27 | End: 2023-02-27 | Stop reason: HOSPADM

## 2023-02-27 RX ORDER — PROCHLORPERAZINE 25 MG
25 SUPPOSITORY, RECTAL RECTAL EVERY 12 HOURS PRN
Status: DISCONTINUED | OUTPATIENT
Start: 2023-02-27 | End: 2023-02-27 | Stop reason: HOSPADM

## 2023-02-27 RX ORDER — METHYLERGONOVINE MALEATE 0.2 MG/ML
200 INJECTION INTRAVENOUS
Status: DISCONTINUED | OUTPATIENT
Start: 2023-02-27 | End: 2023-02-28 | Stop reason: HOSPADM

## 2023-02-27 RX ORDER — SODIUM CHLORIDE, SODIUM LACTATE, POTASSIUM CHLORIDE, CALCIUM CHLORIDE 600; 310; 30; 20 MG/100ML; MG/100ML; MG/100ML; MG/100ML
INJECTION, SOLUTION INTRAVENOUS CONTINUOUS
Status: DISCONTINUED | OUTPATIENT
Start: 2023-02-27 | End: 2023-02-27 | Stop reason: HOSPADM

## 2023-02-27 RX ORDER — IBUPROFEN 800 MG/1
800 TABLET, FILM COATED ORAL EVERY 6 HOURS PRN
Status: DISCONTINUED | OUTPATIENT
Start: 2023-02-27 | End: 2023-02-28 | Stop reason: HOSPADM

## 2023-02-27 RX ORDER — CITRIC ACID/SODIUM CITRATE 334-500MG
30 SOLUTION, ORAL ORAL
Status: DISCONTINUED | OUTPATIENT
Start: 2023-02-27 | End: 2023-02-27 | Stop reason: HOSPADM

## 2023-02-27 RX ORDER — ACETAMINOPHEN 325 MG/1
650 TABLET ORAL EVERY 4 HOURS PRN
Status: DISCONTINUED | OUTPATIENT
Start: 2023-02-27 | End: 2023-02-28 | Stop reason: HOSPADM

## 2023-02-27 RX ORDER — MISOPROSTOL 200 UG/1
800 TABLET ORAL
Status: DISCONTINUED | OUTPATIENT
Start: 2023-02-27 | End: 2023-02-28 | Stop reason: HOSPADM

## 2023-02-27 RX ADMIN — IBUPROFEN 800 MG: 800 TABLET, FILM COATED ORAL at 14:52

## 2023-02-27 RX ADMIN — ACETAMINOPHEN 650 MG: 325 TABLET ORAL at 20:58

## 2023-02-27 RX ADMIN — DOCUSATE SODIUM 100 MG: 100 CAPSULE, LIQUID FILLED ORAL at 19:04

## 2023-02-27 RX ADMIN — Medication: at 11:19

## 2023-02-27 RX ADMIN — ONDANSETRON 4 MG: 2 INJECTION INTRAMUSCULAR; INTRAVENOUS at 10:33

## 2023-02-27 RX ADMIN — SODIUM CHLORIDE, POTASSIUM CHLORIDE, SODIUM LACTATE AND CALCIUM CHLORIDE: 600; 310; 30; 20 INJECTION, SOLUTION INTRAVENOUS at 11:12

## 2023-02-27 RX ADMIN — BUPIVACAINE HYDROCHLORIDE 75 ML: 2.5 INJECTION, SOLUTION EPIDURAL; INFILTRATION; INTRACAUDAL at 11:00

## 2023-02-27 RX ADMIN — Medication 2 MILLI-UNITS/MIN: at 08:35

## 2023-02-27 RX ADMIN — SODIUM CHLORIDE, POTASSIUM CHLORIDE, SODIUM LACTATE AND CALCIUM CHLORIDE: 600; 310; 30; 20 INJECTION, SOLUTION INTRAVENOUS at 08:34

## 2023-02-27 RX ADMIN — IBUPROFEN 800 MG: 800 TABLET ORAL at 20:57

## 2023-02-27 ASSESSMENT — ACTIVITIES OF DAILY LIVING (ADL)
CONCENTRATING,_REMEMBERING_OR_MAKING_DECISIONS_DIFFICULTY: NO
ADLS_ACUITY_SCORE: 29
HEARING_DIFFICULTY_OR_DEAF: NO
ADLS_ACUITY_SCORE: 29
DOING_ERRANDS_INDEPENDENTLY_DIFFICULTY: NO
WALKING_OR_CLIMBING_STAIRS_DIFFICULTY: NO
CHANGE_IN_FUNCTIONAL_STATUS_SINCE_ONSET_OF_CURRENT_ILLNESS/INJURY: NO
ADLS_ACUITY_SCORE: 31
ADLS_ACUITY_SCORE: 29
TOILETING_ISSUES: NO
DIFFICULTY_COMMUNICATING: NO
FALL_HISTORY_WITHIN_LAST_SIX_MONTHS: NO
ADLS_ACUITY_SCORE: 31
ADLS_ACUITY_SCORE: 29
DIFFICULTY_EATING/SWALLOWING: NO
WEAR_GLASSES_OR_BLIND: NO
WEAR_GLASSES_OR_BLIND: NO
DRESSING/BATHING_DIFFICULTY: NO
ADLS_ACUITY_SCORE: 29
ADLS_ACUITY_SCORE: 18

## 2023-02-27 NOTE — CARE PLAN
@ 1406 over a 2 degree laceration.  CAN x1 loose & reduced prior to shoulder delivery.  .  Apgar 8 & 8.  Bedside handoff given to Fabienne MUHAMMAD and cares turned over at 1528.

## 2023-02-27 NOTE — ANESTHESIA PROCEDURE NOTES
"Epidural catheter Procedure Note    Pre-Procedure   Staff -        Anesthesiologist:  Jony Elena MD       Performed By: anesthesiologist       Location: OB  Procedure Documentation  Procedure: epidural catheter   Comments:  Pt seen and interviewed prior to epidural placement for labor analgesia.  This epidural is to be placed in anticipation of normal vaginal delivery.  Risk discussed and consent given prior to performance of procedure.  Time out consisting of identification of patient and desire for epidural analgesia was confirmed.  Sterile prep of lumbar spine was accomplished with povidone iodine three times.  L23 interspace was identified.  Local anesthetic infiltration with 1.5% lido with epi 1/200k was performed with 1.5 cc.  17 G Tuohy needle was advanced in the midline with loss of resistance technique.  No CSF, blood, or paresthesias were noted with placement.  Test dose of 1.5% Lidocaine with epi 1/200k, 3 cc., was injected without evidence of intrathecal or intravascular injection.  Incremental bolus of 0.25% Bupivicaine was injected to a total volume of 15 cc.  Epidural cath 19 G was advanced through needle approx. 6 cm.  No paresthesia was noted with placement and aspiration of cath was negative for blood.  Catheter secured with tegaderm and tape.  Epidural infusion begun after double check of pump settings.  Nursing to inform if there are any complications, but none were noted prior to leaving patient room.  I, or my partners, remain immediately available for management of any issues or complications.  I, or my partners, will monitor at appropriate intervals.  MIKO Elena MD       FOR [facility] ([facility]) ONLY:   Pain Team Contact information: please page the Pain Team Via [employer]. Search \"Pain\". During daytime hours, please page the attending first. At night please page the resident first.    "

## 2023-02-27 NOTE — PROVIDER NOTIFICATION
02/27/23 1356   Provider Notification   Provider Name/Title rick   Method of Notification Phone   Request Attend Delivery

## 2023-02-27 NOTE — PROGRESS NOTES
OB update note    Admission labs:    WBC  5.8  Hemoglobin 11.9  Platelets 141,000    First two blood pressures on admit were 140/90, 131/90    Patient not particularly edematous.     Will complete evaluation of pre-E labs, including AST, ALT, uric acid and urine P:C ratio    No magnesium sulfate for now given no definite evidence of pre-eclampsia, but will monitor clinical course carefully.

## 2023-02-27 NOTE — PLAN OF CARE
@ 39 2/ weeks here for IOL elective.  Patient states she has felt baby moving and denies LOF or VB.  VTX by Leopolds - EFM applied & explained.  See Doc flow sheets for assessment.  MD notified and orders received.

## 2023-02-27 NOTE — H&P
Claudette Gilbert  [id number]  OB Admit History & Physical      HPI:  Ms. Gilbert  is a 32 year old  @ 39w2d by dates who presented to L&D for induction of labor (elective).      Prenatal course:  1st visit at 9 weeks 4 days, regular care, TWG 25#.    Pregnancy complications:  History of macrosomic infant    Prenatal labs:  A positive, antibody screen negative,  Rubella  Immune, Hep B/HIV/RPR all negative, GC/CT negative, ,  GBS negative; genetic screening tests normal Materni T21.      OB history:   OB History    Para Term  AB Living   3 2 2 0 0 2   SAB IAB Ectopic Multiple Live Births   0 0 0 0 2      # Outcome Date GA Lbr Gustavo/2nd Weight Sex Delivery Anes PTL Lv   3 Current            2 Term 20 39w4d 29:35 / 00:18 4.55 kg (10 lb 0.5 oz) M Vag-Spont EPI N HEIDI      Name: JUSTIN,MALE-CLAUDETTE      Apgar1: 7  Apgar5: 9   1 Term                  PMHx:   No past medical history on file.    PSHX:    Past Surgical History:   Procedure Laterality Date     GALLBLADDER SURGERY  2017       Meds:    No current outpatient medications on file.       Allergies: Macrobid [nitrofurantoin]      REVIEW OF SYSTEMS:  Positives and negatives in HPI.     SocHx:    Social History     Socioeconomic History     Marital status:      Spouse name: Not on file     Number of children: Not on file     Years of education: Not on file     Highest education level: Not on file   Occupational History     Not on file   Tobacco Use     Smoking status: Never     Smokeless tobacco: Never   Vaping Use     Vaping Use: Never used   Substance and Sexual Activity     Alcohol use: Yes     Comment: 2 a week     Drug use: Never     Sexual activity: Yes     Partners: Male   Other Topics Concern     Not on file   Social History Narrative     Not on file     Social Determinants of Health     Financial Resource Strain: Low Risk      Difficulty of Paying Living Expenses: Not very hard   Food Insecurity: No Food Insecurity      Worried About Running Out of Food in the Last Year: Never true     Ran Out of Food in the Last Year: Never true   Transportation Needs: No Transportation Needs     Lack of Transportation (Medical): No     Lack of Transportation (Non-Medical): No   Physical Activity: Not on file   Stress: Not on file   Social Connections: Not on file   Intimate Partner Violence: Not on file   Housing Stability: Not on file        Fam Hx:    Family History   Problem Relation Age of Onset     Hypertension Father          PHYSICAL EXAM:      Vitals:  There were no vitals taken for this visit.  Alert Awake in NAD  ABD gravid, non-tender, EFW 9.5#  Cervix:  3 cm / 80 % effaced at -2 station, membranes AROM 0805 , fluid clear  EFM:  Baseline 130, with moderate variability, accels are present or absent, no decels  [place]: contractions are occasional    Assessment:  IUP at 39w2d admitted for induction of labor, elective for a term pregnancy with a favorable cervix.  GBS negative.     Plan:  Admission            Continuous fetal and uterine monitoring            Analgesia discussed, patient desires epidural.  She reports lumbar curvature, will get anesthesia consult prior to the need for the procedure.             The plan of care was discussed with the patient and her partner.  They expressed understanding and agreement.             Anticipate              Shoulder dystocia precautions.             All discussed with the patient and her partner, questions answered.       Dionisio Ortiz MD MD  Dept of OB/GYN  2023

## 2023-02-27 NOTE — CARE PLAN
Data: Claudette Gilbert transferred to 425 via wheelchair at 1625. Baby transferred via parent's arms.  Action: Receiving unit notified of transfer: Yes. Patient and family notified of room change. Report given to Amanda at 0430. Belongings sent to receiving unit. Accompanied by Registered Nurse. Oriented patient to surroundings. Call light within reach. ID bands double-checked with receiving RN.  Response: Patient tolerated transfer and is stable.  Patients mobililty level scored using the bedside mobility assistance tool (BMAT). Patient is at a mobility level test number: 4. Mobility equipment used: wheelchair. Required assist of 0 staff members. Further use of BMAT scoring required.

## 2023-02-27 NOTE — PROGRESS NOTES
OB Progress Note  2023  11:58 AM    Lab follow up    ALT   10                           AST   17                           Uric acid  4.8  Epidural now in place and functioning well    Most recent blood pressure 106/61    Pitocin at 6 mu/min    Cervix now 5+ cm/80/-2      Fetal heart baseline 120  Moderate variability   Accelerations are present  Occasional variable decelerations    Continue labor observation  Anticipate  this afternoon.  Will be watchful for shoulder dystocia.           Dionisio Ortiz MD

## 2023-02-27 NOTE — PROVIDER NOTIFICATION
02/27/23 1315   Provider Notification   Provider Name/Title MANUEL   Method of Notification In Department   Request Evaluate in Person     MD updated & reviewed EFM.

## 2023-02-27 NOTE — PROVIDER NOTIFICATION
02/27/23 0807   Provider Notification   Provider Name/Title MANUEL   Method of Notification At Bedside   Request Evaluate in Person   SVE with AROM.  POC was discussed and questions were answered.  Patient consented to Pitocin.    Patient stated that she had a difficult epidural with her last delivery and agreed that an anesthesia consult prior to needing it for pain management would be acceptable.

## 2023-02-27 NOTE — ANESTHESIA PREPROCEDURE EVALUATION
Anesthesia Pre-Procedure Evaluation    Patient: Claudette Gilbert   MRN: [id number] : 1990        Procedure : * No procedures listed *          History reviewed. No pertinent past medical history.   Past Surgical History:   Procedure Laterality Date     DENTAL SURGERY       GALLBLADDER SURGERY  2017     HYMENOTOMY        Allergies   Allergen Reactions     Macrobid [Nitrofurantoin]      HIVES      Social History     Tobacco Use     Smoking status: Never     Smokeless tobacco: Never   Substance Use Topics     Alcohol use: Not Currently     Comment: 2 a week      Wt Readings from Last 1 Encounters:   23 82.6 kg (182 lb)        Anesthesia Evaluation   Pt has had prior anesthetic. Type: General and OB Labor Epidural.    History of anesthetic complications   difficult epidural placement in past.    ROS/MED HX  ENT/Pulmonary:  - neg pulmonary ROS     Neurologic:  - neg neurologic ROS     Cardiovascular:  - neg cardiovascular ROS     METS/Exercise Tolerance:     Hematologic:  - neg hematologic  ROS     Musculoskeletal:       GI/Hepatic:  - neg GI/hepatic ROS     Renal/Genitourinary:       Endo:  - neg endo ROS     Psychiatric/Substance Use:  - neg psychiatric ROS     Infectious Disease:       Malignancy:       Other:            Physical Exam    Airway        Mallampati: II   TM distance: > 3 FB   Neck ROM: full   Mouth opening: > 3 cm    Respiratory Devices and Support         Dental  no notable dental history         Cardiovascular   cardiovascular exam normal          Pulmonary   pulmonary exam normal            Other findings: Lab Test        23                       0845          1202          1201          WBC          5.8           --          5.5           HGB          11.9         12.5         14.1          MCV          87            --          87            PLT          141*          --          164            No lab results found.            OUTSIDE LABS:  CBC:   Lab  Results   Component Value Date    WBC 5.8 02/27/2023    WBC 5.5 08/03/2022    HGB 11.9 02/27/2023    HGB 12.5 12/12/2022    HCT 36.3 02/27/2023    HCT 41.4 08/03/2022     (L) 02/27/2023     08/03/2022     BMP: No results found for: NA, POTASSIUM, CHLORIDE, CO2, BUN, CR, GLC  COAGS: No results found for: PTT, INR, FIBR  POC: No results found for: BGM, HCG, HCGS  HEPATIC:   Lab Results   Component Value Date    ALT 10 02/27/2023    AST 17 02/27/2023     OTHER:   Lab Results   Component Value Date    A1C 5.0 11/29/2021       Anesthesia Plan    ASA Status:  2      Anesthesia Type: Epidural.              Consents    Anesthesia Plan(s) and associated risks, benefits, and realistic alternatives discussed. Questions answered and patient/representative(s) expressed understanding.    - Discussed:     - Discussed with:  Patient         Postoperative Care            Comments:           neg OB ROS.       Jony Elena MD

## 2023-02-27 NOTE — PROVIDER NOTIFICATION
02/27/23 1003   Provider Notification   Provider Name/Title MANUEL   Method of Notification Phone   Request Evaluate - Remote     Dr Ortiz called to discuss BP and lab results.  Orders were placed to evaluate pre eclampsia.  Patient was informed of labs and the signs of pre eclampsia.  Questions were answered and patient stated that she understood.  Lab will collect the blood and the urine will be obtained when a monsivais is placed after the epidural is started.

## 2023-02-28 VITALS
WEIGHT: 182 LBS | RESPIRATION RATE: 16 BRPM | SYSTOLIC BLOOD PRESSURE: 135 MMHG | TEMPERATURE: 97.8 F | HEART RATE: 90 BPM | DIASTOLIC BLOOD PRESSURE: 78 MMHG | OXYGEN SATURATION: 96 % | BODY MASS INDEX: 29.25 KG/M2 | HEIGHT: 66 IN

## 2023-02-28 LAB — HGB BLD-MCNC: 11.1 G/DL (ref 11.7–15.7)

## 2023-02-28 PROCEDURE — 36415 COLL VENOUS BLD VENIPUNCTURE: CPT | Performed by: OBSTETRICS & GYNECOLOGY

## 2023-02-28 PROCEDURE — 85018 HEMOGLOBIN: CPT | Performed by: OBSTETRICS & GYNECOLOGY

## 2023-02-28 PROCEDURE — 250N000013 HC RX MED GY IP 250 OP 250 PS 637: Performed by: OBSTETRICS & GYNECOLOGY

## 2023-02-28 RX ORDER — NALOXONE HYDROCHLORIDE 0.4 MG/ML
0.2 INJECTION, SOLUTION INTRAMUSCULAR; INTRAVENOUS; SUBCUTANEOUS
Status: DISCONTINUED | OUTPATIENT
Start: 2023-02-28 | End: 2023-02-28 | Stop reason: HOSPADM

## 2023-02-28 RX ORDER — NALOXONE HYDROCHLORIDE 0.4 MG/ML
0.4 INJECTION, SOLUTION INTRAMUSCULAR; INTRAVENOUS; SUBCUTANEOUS
Status: DISCONTINUED | OUTPATIENT
Start: 2023-02-28 | End: 2023-02-28 | Stop reason: HOSPADM

## 2023-02-28 RX ADMIN — BENZOCAINE: 11.4 AEROSOL, SPRAY TOPICAL at 13:09

## 2023-02-28 RX ADMIN — DOCUSATE SODIUM 100 MG: 100 CAPSULE, LIQUID FILLED ORAL at 09:13

## 2023-02-28 RX ADMIN — IBUPROFEN 800 MG: 800 TABLET ORAL at 03:07

## 2023-02-28 RX ADMIN — IBUPROFEN 800 MG: 800 TABLET ORAL at 09:12

## 2023-02-28 RX ADMIN — IBUPROFEN 800 MG: 800 TABLET ORAL at 18:00

## 2023-02-28 RX ADMIN — ACETAMINOPHEN 650 MG: 325 TABLET ORAL at 09:12

## 2023-02-28 RX ADMIN — ACETAMINOPHEN 650 MG: 325 TABLET ORAL at 05:04

## 2023-02-28 RX ADMIN — ACETAMINOPHEN 650 MG: 325 TABLET ORAL at 01:03

## 2023-02-28 ASSESSMENT — ACTIVITIES OF DAILY LIVING (ADL)
ADLS_ACUITY_SCORE: 18

## 2023-02-28 NOTE — L&D DELIVERY NOTE
Delivery Date: 2023    ANTEPARTUM DIAGNOSES:  Intrauterine pregnancy at 39 weeks 2 days gestation, favorable cervix, elective induction of labor.    POSTPARTUM DIAGNOSES:  Status post amniotomy and Pitocin-induced normal spontaneous vaginal delivery, infant male, 9 pounds 2 ounces, Apgars 8 and 8.  Spontaneous placental passage, intact.  Second-degree vaginal and perineal midline laceration, repaired.    PATIENT IDENTIFICATION:  Claudette Gilbert is a 32-year-old  3, para 2 at 39 weeks 2 days' gestation, admitted to [facility] for elective induction of labor.  The patient was followed at our office since 9 weeks' gestation.  She had regular and comprehensive prenatal care.  The pregnancy was complicated by the patient's history of a delivery of a macrosomic infant.  The patient's second vaginal delivery was an infant weighing 10 pounds.  She was monitored carefully during this pregnancy and specifically with a normal 1-hour glucose screen and growth ultrasound, which showed the baby to be large for gestational age.  The patient's blood type is A positive, antibody screen negative, rubella titer showed immunity.  Hepatitis B, HIV, RPR, were all negative.  Chlamydia and gonorrhea DNA probe were negative.  One-hour glucose screen was 139.  Group B strep rectovaginal was negative at term.  Genetic screening tests included a normal [id number].  An early ultrasound confirmed the due date and a 20-week ultrasound showed normal fetal anatomy.  A growth ultrasound at 32 weeks' gestation showed the baby in the 96th percentile.  A followup growth ultrasound at 36+ weeks' gestation showed the baby to be in the 81st percentile.  Group B strep culture at term was negative.  The patient progressed to 39+ weeks.  Her cervix was felt to be favorable with a Pinon score of 8.  Induction of labor was planned on elective basis and the patient was admitted on the morning of 2023.    FIRST STAGE OF LABOR:   Claudette Gilbert was initially admitted at 0730 hours on 02/27/2023 to [facility].  Her initial vital signs showed a mildly elevated blood pressure 140/90 with a repeat of 131/90.  Fetal heart tones were category 1 with a baseline of 130, moderate variability, accelerations were present, decelerations were absent.  Cervical examination showed the cervix to be 3 cm dilated, 80% effaced, and the vertex was at -2.  Amniotomy was performed and the fluid was clear.  Due to the mildly elevated blood pressures, preeclampsia labs were drawn and these were entirely normal, showing a normal AST and ALT, as well as normal urine protein to creatinine ratio of 0.12.  The patient's platelet count was 193,000.  Uric acid was also in the normal range.  Pitocin was initiated by 0830 hours and the Pitocin was started at 2 milliunits per minute and increased to a maximum of 6 milliunits per minute.  The patient rather quickly began having uterine contractions that were increasingly uncomfortable.  An epidural anesthetic was placed at 1116 hours and this provided excellent relief.  The cervix was 5+ cm, 80% effaced, vertex -2.  Fetal heart tones remained category 1.  The patient did get excellent analgesia from the epidural.  Shortly thereafter, she was found to be completely dilated and completely effaced at 1315 hours.  Preparations were made for the second stage of labor after the baby was allowed to labor down.  It should be mentioned that shortly before achieving complete dilation.  The amniotic fluid was noted to be lightly meconium stained.    SECOND STAGE OF LABOR:  The patient was complete by 1315 hours.  The baby was allowed to labor down for just under 1 hour and continued to have a category 1 tracing.  Maternal expulsive efforts were begun at 1404 hours.  I was present during the preparation and pushing.  The patient pushed very effectively, and the vertex of the baby descended rather rapidly in the  pelvis.  At 1406 on 2023, the patient delivered a viable infant male, 9 pounds 2 ounces with Apgars of 8 and 8 at 1 and 5 minutes respectively.  The position was right occiput anterior.  There was a single nuchal cord that was easily reduced.  The remainder of the baby was delivered without shoulder dystocia.  The baby remained on the mother's abdomen after a period of delayed cord clamping.  Eventually, the cord was doubly clamped and ligated by the patient's partner.    THIRD STAGE OF LABOR:  After a 5 minute third stage of labor, the placenta delivered spontaneously intact with a 3-vessel cord.  Intravenous oxytocin was again infused rapidly provided an excellent tone.  Examination of the perineum found a second-degree vaginal and perineal laceration.  This was repaired using 3-0 Vicryl in the usual fashion in layers.  Quantitative blood loss was noted to be 100 mL The patient and the baby remained in the birthing room in excellent condition following delivery, the baby requiring no resuscitative efforts.    DELIVERY SUMMARY:    1.  Intrauterine pregnancy at 39+ weeks' gestation.  2.  Favorable cervix, candidate for elective induction of labor.  3.  Group B strep negative.  4.  Status post amniotomy and Pitocin-induced normal spontaneous vaginal delivery.  5.  Infant male, 9 pounds 2 ounces, Apgars 8 and 8 at 1 and 5 minutes.  6.  Spontaneous placental passage intact.  7.  Second-degree vaginal and perineal laceration, repaired.  8.  Nuchal cord x 1, easily reduced on the perineum.  9.  Excellent maternal and fetal status post delivery with no apparent complications.    Dionisio Ortiz MD        D: 2023   T: 2023   MT: [id number]    Name:     NICOLE ANDERSON  MRN:      [id number]        Account:       [id number]   :      1990           Delivery Date: 2023     Document: [id number]    cc:  Dionisio Ortiz MD

## 2023-02-28 NOTE — PROGRESS NOTES
"2OB Post-partum Note  PPD# 1    S:  Patient doing well.  Pain controlled.  Voiding.  Bleeding is normal.  Breast feeding/pumping.    O:  /67 (BP Location: Left arm, Patient Position: Supine, Cuff Size: Adult Regular)   Pulse 92   Temp 97.6  F (36.4  C) (Oral)   Resp 18   Ht 1.676 m (5' 6\")   Wt 82.6 kg (182 lb)   SpO2 96%   Breastfeeding yes  BMI 29.38 kg/m    Gen- A&O, NAD  Abd- Non-tender, fundus non tender and firm   Ext- non-tender, no calf pain    Hemoglobin   Date Value Ref Range Status   2023 11.1 (L) 11.7 - 15.7 g/dL Final     A POS  Rubella Immune    A/P: 32 year old  PPD# 1 s/p     1.  Routine post-partum cares  2.  Analgesia tylenol and ibuprofen  3.  Discharge today per patient request.  4.  The plan of care was discussed with the patient.  She expressed understanding and agreement  5.  RTC in 6 weeks  6.  Indications to call or return were discussed. Post partum instructions were given  7. Declined Tdap this pregnancy; received in 2020  8. Has had a few mildly elevated BPs - lab normal. Will monitor.  Plan appt for BP check in 1-2 weeks.      Julia Adame MD  2023  8:21 AM  "

## 2023-02-28 NOTE — DISCHARGE INSTRUCTIONS
Postpartum Vaginal Delivery Instructions    Activity     Ask family and friends for help when you need it.  Do not place anything in your vagina for 6 weeks.  You are not restricted on other activities, but take it easy for a few weeks to allow your body to recover from delivery.  You are able to do any activities you feel up to that point.  No driving until you have stopped taking your pain medications (usually two weeks after delivery).     Call your health care provider if you have any of these symptoms:     Increased pain, swelling, redness, or fluid around your stiches from an episiotomy or perineal tear.  A fever above 100.4 F (38 C) with or without chills when placing a thermometer under your tongue.  You soak a sanitary pad with blood within 1 hour, or you see blood clots larger than a golf ball.  Bleeding that lasts more than 6 weeks.  Vaginal discharge that smells bad.  Severe pain, cramping or tenderness in your lower belly area.  A need to urinate more frequently (use the toilet more often), more urgently (use the toilet very quickly), or it burns when you urinate.  Nausea and vomiting.  Redness, swelling or pain around a vein in your leg.  Problems breastfeeding or a red or painful area on your breast.  Chest pain and cough or are gasping for air.  Problems coping with sadness, anxiety, or depression.  If you have any concerns about hurting yourself or the baby, call your provider immediately.   You have questions or concerns after you return home.     Keep your hands clean:  Always wash your hands before touching your perineal area and stitches.  This helps reduce your risk of infection.  If your hands aren't dirty, you may use an alcohol hand-rub to clean your hands. Keep your nails clean and short.      Preeclampsia   Call your doctor right away if you have any of the following:  - Edema (swelling) in your face or hands  - Rapid weight gain-about 1 pound or more in a day  - Headache  - Abdominal pain  on your right side  - Vision problems (flashes or spots)  - You have questions or concerns once you return home.

## 2023-02-28 NOTE — PLAN OF CARE
Data: Vital signs within normal limits. Postpartum checks within normal limits - see flow record. Patient eating and drinking normally. Patient able to empty bladder independently and is up ambulating. Patient performing self cares, is able to care for infant and is formula feeding every 2-3 hours. Pt is also pumping and feeding to infant, but has only gotten a few drops. Using ice for discomfort.   Action: Patient medicated with ibuprofen and tylenol during the shift for pain. See MAR. Adequate pain control noted by patient. Patient education done, see flow record.  Response: Positive attachment behaviors observed with infant. Patient's spouse present this shift.   Plan: Continue current plan of care.  Parents encouraged to call with questions/concerns.

## 2023-02-28 NOTE — PLAN OF CARE
Vital signs stable with the exception of a few elevated blood pressures.  Monitoring bps more frequently -- see note. Pt reports no headache, visual disturbances or RUQ pain.  Reflexes 2+, no clonus.  Pt is up ambulating on her own, voiding without difficulty and independent in cares for self and baby. Pt is pumping and giving EBM to baby.  Pt would like to discharge home early if blood pressures are stable and baby is cleared for discharge.   at bedside and supportive.  Will continue to monitor.

## 2023-02-28 NOTE — PROVIDER NOTIFICATION
02/28/23 1053   Provider Notification   Provider Name/Title Dr Adame   Method of Notification Phone   Request Evaluate-Remote   Notification Reason Other     Dr Adame returned page.  Reported pt's elevated blood pressures this morning as well as her other blood pressures.  Reported pt is asymptomatic.  Orders to do hourly blood pressures to gather more data.  Call MD if blood pressures are equal to or above 150 systolic and/or equal to or above 100 diastolic.  If blood pressures remain under that threshold, then pt may discharge later this afternoon as planned if baby is cleared for discharge.

## 2023-02-28 NOTE — ANESTHESIA POSTPROCEDURE EVALUATION
Patient: Claudette Gilbert    Procedure: * No procedures listed *       Anesthesia Type:  Epidural    Note:  Disposition: Inpatient   Postop Pain Control: Uneventful            Sign Out: Well controlled pain   PONV: No   Neuro/Psych: Uneventful            Sign Out: Acceptable/Baseline neuro status   Airway/Respiratory: Uneventful            Sign Out: Acceptable/Baseline resp. status   CV/Hemodynamics: Uneventful            Sign Out: Acceptable CV status; No obvious hypovolemia; No obvious fluid overload   Other NRE: NONE   DID A NON-ROUTINE EVENT OCCUR? No    Event details/Postop Comments:  S/P epidural for labor. I or my partner remained immediately available, monitored the patient, and supervised nursing staff at key events and necessary intervals.    The patient is doing well. VSS Temp normal. Satisfactory cardiovascular and repiratory function. Neuro at baseline. Denies positional headache. Minimal side effects easily managed w/ PRN meds. No apparent anesthetic complications. No follow-up required.    [email or web address]           Last vitals:  Vitals:    02/28/23 0100 02/28/23 0510 02/28/23 0600   BP: 120/77 (!) 140/84 118/67   Pulse:      Resp: 16 18    Temp: 97.6  F (36.4  C) 97.6  F (36.4  C)    SpO2: 96%         Electronically Signed By: Gerardo Phillips MD  February 28, 2023  8:39 AM

## 2023-02-28 NOTE — PLAN OF CARE
Pt's BP's under 150/100 this afternoon, per provider it is okay for pt to discharge, and she will follow up for a blood pressure check in a week. All other PP assessments and vital signs stable. Discharge instructions completed.  Patient states she understands all discharge instructions and all her questions have been answered.  Verbalizes when she needs to return to clinic for follow up for herself and baby.  She is caring for herself and her baby independently. No prescriptions sent with patient. Postpartum depression symptoms reviewed and encouraged frequent review of depression scale. Pt's  will be providing a ride home. Pt and infant discharged on 2/28/2023.

## 2023-02-28 NOTE — PLAN OF CARE
Vital signs stable, fundal checks within normal limits. Up independently in room, voiding spontaneously and tolerating a regular diet. Independent with baby cares, spouse at bedside supportive of patient and baby. Pain managed with PRN pain medications. Formula feeding baby every 2-3 hours and on demand, pumping as well.

## 2023-03-26 ENCOUNTER — HEALTH MAINTENANCE LETTER (OUTPATIENT)
Age: 33
End: 2023-03-26

## 2023-04-17 ENCOUNTER — LAB REQUISITION (OUTPATIENT)
Dept: LAB | Facility: CLINIC | Age: 33
End: 2023-04-17
Payer: COMMERCIAL

## 2023-04-17 DIAGNOSIS — Z12.4 ENCOUNTER FOR SCREENING FOR MALIGNANT NEOPLASM OF CERVIX: ICD-10-CM

## 2023-04-17 PROCEDURE — G0145 SCR C/V CYTO,THINLAYER,RESCR: HCPCS | Mod: ORL | Performed by: OBSTETRICS & GYNECOLOGY

## 2023-04-17 PROCEDURE — 87624 HPV HI-RISK TYP POOLED RSLT: CPT | Mod: ORL | Performed by: OBSTETRICS & GYNECOLOGY

## 2023-04-19 LAB
BKR LAB AP GYN ADEQUACY: NORMAL
BKR LAB AP GYN INTERPRETATION: NORMAL
BKR LAB AP HPV REFLEX: NORMAL
BKR LAB AP LMP: NORMAL
BKR LAB AP PREVIOUS ABNL DX: NORMAL
BKR LAB AP PREVIOUS ABNORMAL: NORMAL
PATH REPORT.COMMENTS IMP SPEC: NORMAL
PATH REPORT.COMMENTS IMP SPEC: NORMAL
PATH REPORT.RELEVANT HX SPEC: NORMAL

## 2023-04-21 LAB
HUMAN PAPILLOMA VIRUS 16 DNA: NEGATIVE
HUMAN PAPILLOMA VIRUS 18 DNA: NEGATIVE
HUMAN PAPILLOMA VIRUS FINAL DIAGNOSIS: ABNORMAL
HUMAN PAPILLOMA VIRUS OTHER HR: POSITIVE

## 2024-05-26 ENCOUNTER — HEALTH MAINTENANCE LETTER (OUTPATIENT)
Age: 34
End: 2024-05-26

## 2025-04-07 ENCOUNTER — OFFICE VISIT (OUTPATIENT)
Dept: URGENT CARE | Facility: URGENT CARE | Age: 35
End: 2025-04-07

## 2025-04-07 VITALS
OXYGEN SATURATION: 99 % | BODY MASS INDEX: 28.73 KG/M2 | SYSTOLIC BLOOD PRESSURE: 118 MMHG | DIASTOLIC BLOOD PRESSURE: 81 MMHG | HEART RATE: 91 BPM | WEIGHT: 178 LBS | RESPIRATION RATE: 16 BRPM | TEMPERATURE: 98.7 F

## 2025-04-07 DIAGNOSIS — H57.89 REDNESS OF LEFT EYE: ICD-10-CM

## 2025-04-07 DIAGNOSIS — H57.89 SCLERAL INJECTION: Primary | ICD-10-CM

## 2025-04-07 DIAGNOSIS — H57.8A2 FOREIGN BODY SENSATION, LEFT EYE: ICD-10-CM

## 2025-04-07 PROCEDURE — 99203 OFFICE O/P NEW LOW 30 MIN: CPT | Performed by: INTERNAL MEDICINE

## 2025-04-07 RX ORDER — ERYTHROMYCIN 5 MG/G
0.5 OINTMENT OPHTHALMIC 4 TIMES DAILY
Qty: 3.5 G | Refills: 0 | Status: SHIPPED | OUTPATIENT
Start: 2025-04-07

## 2025-04-07 NOTE — PROGRESS NOTES
Urgent Care Clinic Visit    Chief Complaint   Patient presents with    Eye Swelling     Yesterday, left top eye lid, redness, feels like something in there               4/7/2025    11:39 AM   Additional Questions   Roomed by Mariola MONROE

## 2025-04-07 NOTE — PATIENT INSTRUCTIONS
No foreign body  Area of uptake of dye left upper eye - may be self trauma  Erythromycin antibiotics   Cool compress  ibuprofen     See Ophthalmology if not improved in next few days  Sooner if vision concerns.

## 2025-04-07 NOTE — PROGRESS NOTES
Well like like leaving yeah they are breathing GEN ASSESSMENT AND PLAN:      ICD-10-CM    1. Scleral injection  H57.89 erythromycin (ROMYCIN) 5 MG/GM ophthalmic ointment      2. Redness of left eye  H57.89 erythromycin (ROMYCIN) 5 MG/GM ophthalmic ointment      3. Foreign body sensation, left eye  H57.8A2 erythromycin (ROMYCIN) 5 MG/GM ophthalmic ointment      Discussed with patient she potentially could have had a foreign body such as hair eyelash that she rubbed and caused scleral irritation.  Due to the size of scleral irritation from 12-3, topical antibiotic given.  If does not improve with conservative therapy with cool compress ibuprofen, recommend seeing ophthalmology.  Concern would be episcleritis and discussed therapy different treatments available through them.  Red flag symptoms would include vision changes and  Significant pain to be seen sooner by ophthalmology for full exam      Patient Instructions   No foreign body  Area of uptake of dye left upper eye - may be self trauma  Erythromycin antibiotics   Cool compress  ibuprofen     See Ophthalmology if not improved in next few days  Sooner if vision concerns.    Ary Faye MD  [facility]    Subjective     Claudette Gilbert is a 34 year old who presents for Patient presents with:  Eye Swelling: Yesterday, left top eye lid, redness, feels like something in there    a new patient of [facility].    Eye Problem    Onset of symptoms was 1 day(s) ago.   Location: left eye   Woke up yesterday and felt like something was under her left upper eyelid.  She may have rubbed it  .  She comes in today because the redness is worse and now her left upper eyelid is swollen.  Denies vision changes.  No severe pain.  Treatment measures tried include eyedrops      Review of Systems        Objective    /81 (BP Location: Right arm, Patient Position: Sitting, Cuff Size: Adult Regular)   Pulse 91   Temp 98.7  F (37.1  C) (Tympanic)   Resp  16   Wt 80.7 kg (178 lb)   LMP 03/18/2025   SpO2 99%   Breastfeeding No   BMI 28.73 kg/m    Physical Exam  Vitals reviewed.   Constitutional:       Appearance: Normal appearance.   Eyes:      Extraocular Movements: Extraocular movements intact.      Pupils: Pupils are equal, round, and reactive to light.      Comments: Left upper eyelid slightly pink and swollen.  Topical anesthetic applied  Significant left eye scleral injection most prominent superior lateral and inferior.  More sparing of the medial sclera area.  Eyelid eversion no stye present.  No foreign body noticed.    Fluorescein drop.  Dye uptake from 12-3 flushing area of the sclera noted.  Eyelid eversion again.  No foreign body noted         Neurological:      Mental Status: She is alert.

## 2025-06-14 ENCOUNTER — HEALTH MAINTENANCE LETTER (OUTPATIENT)
Age: 35
End: 2025-06-14